# Patient Record
Sex: FEMALE | Race: BLACK OR AFRICAN AMERICAN | NOT HISPANIC OR LATINO | Employment: FULL TIME | URBAN - METROPOLITAN AREA
[De-identification: names, ages, dates, MRNs, and addresses within clinical notes are randomized per-mention and may not be internally consistent; named-entity substitution may affect disease eponyms.]

---

## 2017-07-06 ENCOUNTER — APPOINTMENT (OUTPATIENT)
Dept: RADIOLOGY | Facility: MEDICAL CENTER | Age: 22
End: 2017-07-06
Attending: EMERGENCY MEDICINE
Payer: COMMERCIAL

## 2017-07-06 ENCOUNTER — HOSPITAL ENCOUNTER (EMERGENCY)
Facility: MEDICAL CENTER | Age: 22
End: 2017-07-06
Attending: EMERGENCY MEDICINE
Payer: COMMERCIAL

## 2017-07-06 VITALS
HEART RATE: 74 BPM | RESPIRATION RATE: 16 BRPM | SYSTOLIC BLOOD PRESSURE: 112 MMHG | DIASTOLIC BLOOD PRESSURE: 64 MMHG | HEIGHT: 63 IN | TEMPERATURE: 97.8 F | BODY MASS INDEX: 29.8 KG/M2 | WEIGHT: 168.21 LBS | OXYGEN SATURATION: 99 %

## 2017-07-06 DIAGNOSIS — R10.9 ABDOMINAL PAIN IN PREGNANCY, FIRST TRIMESTER: ICD-10-CM

## 2017-07-06 DIAGNOSIS — O26.891 ABDOMINAL PAIN IN PREGNANCY, FIRST TRIMESTER: ICD-10-CM

## 2017-07-06 LAB
ANION GAP SERPL CALC-SCNC: 6 MMOL/L (ref 0–11.9)
APPEARANCE UR: CLEAR
APPEARANCE UR: CLEAR
B-HCG SERPL-ACNC: 2295.2 MIU/ML (ref 0–5)
BASOPHILS # BLD AUTO: 0.3 % (ref 0–1.8)
BASOPHILS # BLD: 0.03 K/UL (ref 0–0.12)
BILIRUB UR QL STRIP.AUTO: NEGATIVE
BUN SERPL-MCNC: 6 MG/DL (ref 8–22)
CALCIUM SERPL-MCNC: 9.3 MG/DL (ref 8.5–10.5)
CHLORIDE SERPL-SCNC: 107 MMOL/L (ref 96–112)
CO2 SERPL-SCNC: 23 MMOL/L (ref 20–33)
COLOR UR AUTO: YELLOW
COLOR UR: YELLOW
CREAT SERPL-MCNC: 0.67 MG/DL (ref 0.5–1.4)
CULTURE IF INDICATED INDCX: NO UA CULTURE
EOSINOPHIL # BLD AUTO: 0.17 K/UL (ref 0–0.51)
EOSINOPHIL NFR BLD: 1.8 % (ref 0–6.9)
ERYTHROCYTE [DISTWIDTH] IN BLOOD BY AUTOMATED COUNT: 42.1 FL (ref 35.9–50)
GFR SERPL CREATININE-BSD FRML MDRD: >60 ML/MIN/1.73 M 2
GLUCOSE SERPL-MCNC: 84 MG/DL (ref 65–99)
GLUCOSE UR QL STRIP.AUTO: NEGATIVE MG/DL
GLUCOSE UR STRIP.AUTO-MCNC: NEGATIVE MG/DL
HCG UR QL: POSITIVE
HCT VFR BLD AUTO: 35.1 % (ref 37–47)
HGB BLD-MCNC: 11.3 G/DL (ref 12–16)
IMM GRANULOCYTES # BLD AUTO: 0.02 K/UL (ref 0–0.11)
IMM GRANULOCYTES NFR BLD AUTO: 0.2 % (ref 0–0.9)
KETONES UR QL STRIP.AUTO: ABNORMAL MG/DL
KETONES UR STRIP.AUTO-MCNC: ABNORMAL MG/DL
LEUKOCYTE ESTERASE UR QL STRIP.AUTO: NEGATIVE
LEUKOCYTE ESTERASE UR QL STRIP.AUTO: NEGATIVE
LYMPHOCYTES # BLD AUTO: 2.91 K/UL (ref 1–4.8)
LYMPHOCYTES NFR BLD: 31 % (ref 22–41)
MCH RBC QN AUTO: 28.4 PG (ref 27–33)
MCHC RBC AUTO-ENTMCNC: 32.2 G/DL (ref 33.6–35)
MCV RBC AUTO: 88.2 FL (ref 81.4–97.8)
MICRO URNS: ABNORMAL
MONOCYTES # BLD AUTO: 0.57 K/UL (ref 0–0.85)
MONOCYTES NFR BLD AUTO: 6.1 % (ref 0–13.4)
NEUTROPHILS # BLD AUTO: 5.68 K/UL (ref 2–7.15)
NEUTROPHILS NFR BLD: 60.6 % (ref 44–72)
NITRITE UR QL STRIP.AUTO: NEGATIVE
NITRITE UR QL STRIP.AUTO: NEGATIVE
NRBC # BLD AUTO: 0 K/UL
NRBC BLD AUTO-RTO: 0 /100 WBC
NUMBER OF RH DOSES IND 8505RD: NORMAL
PH UR STRIP.AUTO: 5.5 [PH]
PH UR STRIP.AUTO: 6 [PH]
PLATELET # BLD AUTO: 414 K/UL (ref 164–446)
PMV BLD AUTO: 9.6 FL (ref 9–12.9)
POTASSIUM SERPL-SCNC: 3 MMOL/L (ref 3.6–5.5)
PROT UR QL STRIP: NEGATIVE MG/DL
PROT UR QL STRIP: NEGATIVE MG/DL
RBC # BLD AUTO: 3.98 M/UL (ref 4.2–5.4)
RBC UR QL AUTO: NEGATIVE
RBC UR QL AUTO: NEGATIVE
RH BLD: NORMAL
SODIUM SERPL-SCNC: 136 MMOL/L (ref 135–145)
SP GR UR STRIP.AUTO: 1.03
SP GR UR: 1.02
WBC # BLD AUTO: 9.4 K/UL (ref 4.8–10.8)

## 2017-07-06 PROCEDURE — 99284 EMERGENCY DEPT VISIT MOD MDM: CPT

## 2017-07-06 PROCEDURE — 81025 URINE PREGNANCY TEST: CPT

## 2017-07-06 PROCEDURE — 85025 COMPLETE CBC W/AUTO DIFF WBC: CPT

## 2017-07-06 PROCEDURE — 81002 URINALYSIS NONAUTO W/O SCOPE: CPT

## 2017-07-06 PROCEDURE — 700111 HCHG RX REV CODE 636 W/ 250 OVERRIDE (IP): Performed by: EMERGENCY MEDICINE

## 2017-07-06 PROCEDURE — 84702 CHORIONIC GONADOTROPIN TEST: CPT

## 2017-07-06 PROCEDURE — 86901 BLOOD TYPING SEROLOGIC RH(D): CPT

## 2017-07-06 PROCEDURE — 36415 COLL VENOUS BLD VENIPUNCTURE: CPT

## 2017-07-06 PROCEDURE — 76817 TRANSVAGINAL US OBSTETRIC: CPT

## 2017-07-06 PROCEDURE — 80048 BASIC METABOLIC PNL TOTAL CA: CPT

## 2017-07-06 PROCEDURE — 81003 URINALYSIS AUTO W/O SCOPE: CPT

## 2017-07-06 RX ORDER — ONDANSETRON 4 MG/1
4 TABLET, ORALLY DISINTEGRATING ORAL EVERY 8 HOURS PRN
Qty: 10 TAB | Refills: 1 | Status: SHIPPED | OUTPATIENT
Start: 2017-07-06 | End: 2017-07-18

## 2017-07-06 RX ORDER — ONDANSETRON 4 MG/1
4 TABLET, ORALLY DISINTEGRATING ORAL ONCE
Status: COMPLETED | OUTPATIENT
Start: 2017-07-06 | End: 2017-07-06

## 2017-07-06 RX ADMIN — ONDANSETRON 4 MG: 4 TABLET, ORALLY DISINTEGRATING ORAL at 16:34

## 2017-07-06 ASSESSMENT — PAIN SCALES - GENERAL: PAINLEVEL_OUTOF10: 6

## 2017-07-06 NOTE — ED NOTES
Pt to triage c/o abd cramping and nausea x 2 weeks. Pt pregnant, unsure how far she is. Denies vaginal bleeding. Pt advised to return to triage nurse for any changes or concerns.

## 2017-07-06 NOTE — ED AVS SNAPSHOT
7/6/2017    Caroline Jorgensen  [address] [place] [address]    Dear Caroline:    [facility] wants to ensure your discharge home is safe and you or your loved ones have had all of your questions answered regarding your care after you leave the hospital.    Below is a list of resources and contact information should you have any questions regarding your hospital stay, follow-up instructions, or active medical symptoms.    Questions or Concerns Regarding… Contact   Medical Questions Related to Your Discharge  (7 days a week, 8am-5pm) Contact a Nurse Care Coordinator   [phone]   Medical Questions Not Related to Your Discharge  (24 hours a day / 7 days a week)  Contact the Nurse Health Line   [phone]    Medications or Discharge Instructions Refer to your discharge packet   or contact your [facility] Primary Care Provider   [phone]   Follow-up Appointment(s) Schedule your appointment via [employer]   or contact Scheduling [phone]   Billing Review your statement via [employer]  or contact Billing [phone]   Medical Records Review your records via [employer]   or contact Medical Records [phone]     You may receive a telephone call within two days of discharge. This call is to make certain you understand your discharge instructions and have the opportunity to have any questions answered. You can also easily access your medical information, test results and upcoming appointments via the [employer] First free online health management tool. You can learn more and sign up at [employer] First. For assistance setting up your [employer] First account, please call [phone].    Once again, we want to ensure your discharge home is safe and that you have a clear understanding of any next steps in your care. If you have any questions or concerns, please do not hesitate to contact us, we are here for you. Thank you for choosing [facility] for your healthcare needs.    Sincerely,    Your [facility] Healthcare Team

## 2017-07-06 NOTE — ED AVS SNAPSHOT
Home Care Instructions                                                                                                                Caroline Jorgensen   MRN: [id number]    Department:  [facility], Emergency Dept   Date of Visit:  7/6/2017            [facility], Emergency Dept    [address] [place] [id number]    Phone:  [phone]      You were seen by     Eddie Paredes D.O.      Your Diagnosis Was     Abdominal pain in pregnancy, first trimester     O26.891, R10.9       These are the medications you received during your hospitalization from 07/06/2017 1204 to 07/06/2017 1834     Date/Time Order Dose Route Action    07/06/2017 1634 ondansetron (ZOFRAN ODT) dispertab 4 mg 4 mg Oral Given      Follow-up Information     1. Follow up with Sherri Cortés M.D..    Specialty:  OB/Gyn    Contact information    [address]  [facility] [address] [phone]          2. Follow up with [facility], Emergency Dept In 2 days.    Specialty:  Emergency Medicine    Why:  For repeat blood test    Contact information    [address] [place] [id number] [phone]        3. Schedule an appointment as soon as possible for a visit with Sherri Cortés M.D..    Specialty:  OB/Gyn    Contact information    [address]  [facility] [address] [phone]        Medication Information     Review all of your home medications and newly ordered medications with your primary doctor and/or pharmacist as soon as possible. Follow medication instructions as directed by your doctor and/or pharmacist.     Please keep your complete medication list with you and share with your physician. Update the information when medications are discontinued, doses are changed, or new medications (including over-the-counter products) are added; and carry medication information at all times in the event of emergency situations.               Medication List      START  taking these medications        Instructions    Morning Afternoon Evening Bedtime    ondansetron 4 MG Tbdp   Last time this was given:  4 mg on 7/6/2017  4:34 PM   Commonly known as:  ZOFRAN ODT        Take 1 Tab by mouth every 8 hours as needed for Nausea/Vomiting.   Dose:  4 mg                             Where to Get Your Medications      You can get these medications from any pharmacy     Bring a paper prescription for each of these medications    - ondansetron 4 MG Tbdp            Procedures and tests performed during your visit     BASIC METABOLIC PANEL    CBC WITH DIFFERENTIAL    ESTIMATED GFR    HCG QUANTITATIVE    POC UA    POC URINE PREGNANCY    RH TYPE FOR RHOGAM FROM E.D.    URINALYSIS,CULTURE IF INDICATED    US-OB PELVIS TRANSVAGINAL        Discharge Instructions       Abdominal Pain During Pregnancy  Belly (abdominal) pain is common during pregnancy. Most of the time, it is not a serious problem. Other times, it can be a sign that something is wrong with the pregnancy. Always tell your doctor if you have belly pain.  HOME CARE  Monitor your belly pain for any changes. The following actions may help you feel better:  · Do not have sex (intercourse) or put anything in your vagina until you feel better.  · Rest until your pain stops.  · Drink clear fluids if you feel sick to your stomach (nauseous). Do not eat solid food until you feel better.  · Only take medicine as told by your doctor.  · Keep all doctor visits as told.  GET HELP RIGHT AWAY IF:   · You are bleeding, leaking fluid, or pieces of tissue come out of your vagina.  · You have more pain or cramping.  · You keep throwing up (vomiting).  · You have pain when you pee (urinate) or have blood in your pee.  · You have a fever.  · You do not feel your baby moving as much.  · You feel very weak or feel like passing out.  · You have trouble breathing, with or without belly pain.  · You have a very bad headache and belly pain.  · You have fluid leaking  from your vagina and belly pain.  · You keep having watery poop (diarrhea).  · Your belly pain does not go away after resting, or the pain gets worse.  MAKE SURE YOU:   · Understand these instructions.  · Will watch your condition.  · Will get help right away if you are not doing well or get worse.     This information is not intended to replace advice given to you by your health care provider. Make sure you discuss any questions you have with your health care provider.     Document Released: 12/06/2010 Document Revised: 08/20/2014 Document Reviewed: 07/17/2014  [employer] Interactive Patient Education ©2016 [employer]            Patient Information     Patient Information    Following emergency treatment: all patient requiring follow-up care must return either to a private physician or a clinic if your condition worsens before you are able to obtain further medical attention, please return to the emergency room.     Billing Information    At [facility], we work to make the billing process streamlined for our patients.  Our Representatives are here to answer any questions you may have regarding your hospital bill.  If you have insurance coverage and have supplied your insurance information to us, we will submit a claim to your insurer on your behalf.  Should you have any questions regarding your bill, we can be reached online or by phone as follows:  Online: You are able pay your bills online or live chat with our representatives about any billing questions you may have. We are here to help Monday - Friday from 8:00am to 7:30pm and 9:00am - 12:00pm on Saturdays.  Please visit [email or web address]  for more information.   Phone:  [phone] or [phone]    Please note that your emergency physician, surgeon, pathologist, radiologist, anesthesiologist, and other specialists are not employed by [facility] and will therefore bill separately for their services.  Please contact them  directly for any questions concerning their bills at the numbers below:     Emergency Physician Services:  [phone]  [facility]:  [phone]  [facility]:  [phone]  [facility]:  [phone]    1. Your final bill may vary from the amount quoted upon discharge if all procedures are not complete at that time, or if your doctor has additional procedures of which we are not aware. You will receive an additional bill if you return to the Emergency Department at [facility] for suture removal regardless of the facility of which the sutures were placed.     2. Please arrange for settlement of this account at the emergency registration.    3. All self-pay accounts are due in full at the time of treatment.  If you are unable to meet this obligation then payment is expected within 4-5 days.     4. If you have had radiology studies (CT, X-ray, Ultrasound, MRI), you have received a preliminary result during your emergency department visit. Please contact the radiology department [phone] to receive a copy of your final result. Please discuss the Final result with your primary physician or with the follow up physician provided.     Crisis Hotline:  [place] Crisis Hotline:  1-853-FIVNZJT or 1-628.617.1617  [place] Crisis Hotline:    1-880.391.9220 or 749-684-6148         ED Discharge Follow Up Questions    1. In order to provide you with very good care, we would like to follow up with a phone call in the next few days.  May we have your permission to contact you?     YES /  NO    2. What is the best phone number to call you? (       )_____-__________    3. What is the best time to call you?      Morning  /  Afternoon  /  Evening                   Patient Signature:  ____________________________________________________________    Date:  ____________________________________________________________

## 2017-07-06 NOTE — ED AVS SNAPSHOT
Biosyntech Access Code: [email or web address]  Expires: 8/5/2017  6:34 PM    Your email address is not on file at [facility].  Email Addresses are required for you to sign up for [employer], please contact [phone] to verify your personal information and to provide your email address prior to attempting to register for [employer]yn[employer].    Caroline Jorgensen  [address] [facility] [place] [address]    B[employer]tech  A secure, online tool to manage your health information     [employer]’s Biosyntech® is a secure, online tool that connects you to your personalized health information from the privacy of your home -- day or night - making it very easy for you to manage your healthcare. Once the activation process is completed, you can even access your medical information using the Biosyntech zackary, which is available for free in the Apple Zackary store or Google Play store.     To learn more about Biosyntech, visit [email or web address]    There are two levels of access available (as shown below):   My Chart Features  [facility] Primary Care Doctor [facility]  Specialists [facility]  Urgent  Care Non-[facility] Primary Care Doctor   Email your healthcare team securely and privately 24/7 X X X    Manage appointments: schedule your next appointment; view details of past/upcoming appointments X      Request prescription refills. X      View recent personal medical records, including lab and immunizations X X X X   View health record, including health history, allergies, medications X X X X   Read reports about your outpatient visits, procedures, consult and ER notes X X X X   See your discharge summary, which is a recap of your hospital and/or ER visit that includes your diagnosis, lab results, and care plan X X  X     How to register for [employer]:  Once your e-mail address has been verified, follow the following steps to sign up for Biosyntech.     1. Go to  [email or web address]  2. Click on the Sign Up Now box, which takes you to the New Member Sign Up page. You will need to  provide the following information:  a. Enter your Etohum Access Code exactly as it appears at the top of this page. (You will not need to use this code after you’ve completed the sign-up process. If you do not sign up before the expiration date, you must request a new code.)   b. Enter your date of birth.   c. Enter your home email address.   d. Click Submit, and follow the next screen’s instructions.  3. Create a Etohum ID. This will be your Etohum login ID and cannot be changed, so think of one that is secure and easy to remember.  4. Create a Etohum password. You can change your password at any time.  5. Enter your Password Reset Question and Answer. This can be used at a later time if you forget your password.   6. Enter your e-mail address. This allows you to receive e-mail notifications when new information is available in Etohum.  7. Click Sign Up. You can now view your health information.    For assistance activating your Etohum account, call [phone]

## 2017-07-07 NOTE — ED PROVIDER NOTES
"ED Provider Note    CHIEF COMPLAINT  Chief Complaint   Patient presents with   • Cramping       HPI  Caroline Jorgensen is a 22 y.o. female who presents to the emergency room today with complaints of abdominal pain, cramping with nausea with pregnancy. Patient states her last missed her period was the end of May/beginning of . She has a history of  2 para 0. Took a home pregnancy test which was positive. She denies any vaginal bleeding or discharge. No fever chills or changes to bowel or bladder. Pain is described cramping currently rated at 2/10, radiating to both sides of her lower abdomen. Denies chest pain no shortness of breath or other complaints at this time.    REVIEW OF SYSTEMS  See HPI for further details. All other systems are negative.      PAST MEDICAL HISTORY  No past medical history on file.    FAMILY HISTORY  No family history on file.    SOCIAL HISTORY  Social History     Social History   • Marital Status: Single     Spouse Name: N/A   • Number of Children: N/A   • Years of Education: N/A     Social History Main Topics   • Smoking status: Not on file   • Smokeless tobacco: Not on file   • Alcohol Use: Not on file   • Drug Use: Not on file   • Sexual Activity: Not on file     Other Topics Concern   • Not on file     Social History Narrative   • No narrative on file       SURGICAL HISTORY  No past surgical history on file.    CURRENT MEDICATIONS  Home Medications     **Home medications have not yet been reviewed for this encounter**          ALLERGIES  No Known Allergies    PHYSICAL EXAM  VITAL SIGNS: /64 mmHg  Pulse 74  Temp(Src) 36.6 °C (97.8 °F) (Temporal)  Resp 16  Ht 1.6 m (5' 3\")  Wt 76.3 kg (168 lb 3.4 oz)  BMI 29.80 kg/m2  SpO2 99%      Constitutional: Well developed, Well nourished, No acute distress, Non-toxic appearance.   HENT: Normocephalic, Atraumatic, Bilateral external ears normal, Oropharynx moist, No oral exudates, Nose normal.   Eyes: PERRLA, EOMI, Conjunctiva " normal, No discharge.   Neck: Normal range of motion, No tenderness, Supple, No stridor.   Lymphatic: No lymphadenopathy noted.   Cardiovascular: Normal heart rate, Normal rhythm, No murmurs, No rubs, No gallops.   Thorax & Lungs: Normal breath sounds, No respiratory distress, No wheezing, No chest tenderness.   Abdomen: Bowel sounds normal, Soft, No tenderness, No masses, No pulsatile masses. No rebound, guarding or peritoneal signs noted  Genitalia: Patient declined   Skin: Warm, Dry, No erythema, No rash.   Back: No tenderness, No CVA tenderness.     RADIOLOGY/PROCEDURES  US-OB PELVIS TRANSVAGINAL   Final Result      1.  Tiny fluid collection within the endometrial canal; early pregnancy versus pseudosac from ectopic pregnancy. Recommend followup ultrasound and serial beta-hCG levels.   2.  Probable LEFT ovary corpus luteum.               COURSE & MEDICAL DECISION MAKING  Pertinent Labs & Imaging studies reviewed. (See chart for details)  Patient's quantitative hCG is 2295, Rh is positive. Chest sounds shows early pregnancy versus possible ectopic. Discussed case with Dr. mccall who is on-call for GYN. She advises follow-up on 6/8 for repeat quantitative hCG. Cannot fully rule out ectopic present this time patient vital signs are stable she appears in no distress on exam. She was told the importance of follow-up for repeat testing and the above findings cannot rule out ectopic pregnancy versus early pregnancy. She is placed in Zofran for nausea in pregnancy. The patient and her significant other will return as described above discharged in stable condition.      FINAL IMPRESSION  1. Acute abdominal pain in pregnancy  2.   3.      Electronically signed by: Eddie Paredes, 7/6/2017 9:55 PM

## 2017-07-07 NOTE — ED NOTES
Pt given discharge instructions and prescription. Pt verbalized understanding. RN to answer any questions pt and family had. Pt instructed to return on Saturday to repeat her blood work. VSS. Pt ambulated out to front [facility].

## 2017-07-07 NOTE — DISCHARGE INSTRUCTIONS
Abdominal Pain During Pregnancy  Belly (abdominal) pain is common during pregnancy. Most of the time, it is not a serious problem. Other times, it can be a sign that something is wrong with the pregnancy. Always tell your doctor if you have belly pain.  HOME CARE  Monitor your belly pain for any changes. The following actions may help you feel better:  · Do not have sex (intercourse) or put anything in your vagina until you feel better.  · Rest until your pain stops.  · Drink clear fluids if you feel sick to your stomach (nauseous). Do not eat solid food until you feel better.  · Only take medicine as told by your doctor.  · Keep all doctor visits as told.  GET HELP RIGHT AWAY IF:   · You are bleeding, leaking fluid, or pieces of tissue come out of your vagina.  · You have more pain or cramping.  · You keep throwing up (vomiting).  · You have pain when you pee (urinate) or have blood in your pee.  · You have a fever.  · You do not feel your baby moving as much.  · You feel very weak or feel like passing out.  · You have trouble breathing, with or without belly pain.  · You have a very bad headache and belly pain.  · You have fluid leaking from your vagina and belly pain.  · You keep having watery poop (diarrhea).  · Your belly pain does not go away after resting, or the pain gets worse.  MAKE SURE YOU:   · Understand these instructions.  · Will watch your condition.  · Will get help right away if you are not doing well or get worse.     This information is not intended to replace advice given to you by your health care provider. Make sure you discuss any questions you have with your health care provider.     Document Released: 12/06/2010 Document Revised: 08/20/2014 Document Reviewed: 07/17/2014  "[employer] Interactive Patient Education ©2016 "[employer] Inc.

## 2017-07-08 ENCOUNTER — APPOINTMENT (OUTPATIENT)
Dept: RADIOLOGY | Facility: MEDICAL CENTER | Age: 22
End: 2017-07-08
Attending: EMERGENCY MEDICINE
Payer: COMMERCIAL

## 2017-07-08 ENCOUNTER — HOSPITAL ENCOUNTER (EMERGENCY)
Facility: MEDICAL CENTER | Age: 22
End: 2017-07-08
Attending: EMERGENCY MEDICINE
Payer: COMMERCIAL

## 2017-07-08 VITALS
TEMPERATURE: 96.6 F | HEART RATE: 72 BPM | SYSTOLIC BLOOD PRESSURE: 98 MMHG | BODY MASS INDEX: 29.61 KG/M2 | HEIGHT: 63 IN | DIASTOLIC BLOOD PRESSURE: 58 MMHG | OXYGEN SATURATION: 100 % | WEIGHT: 167.11 LBS | RESPIRATION RATE: 20 BRPM

## 2017-07-08 DIAGNOSIS — R10.9 ABDOMINAL PAIN IN PREGNANCY, FIRST TRIMESTER: ICD-10-CM

## 2017-07-08 DIAGNOSIS — O26.891 ABDOMINAL PAIN IN PREGNANCY, FIRST TRIMESTER: ICD-10-CM

## 2017-07-08 LAB
B-HCG SERPL-ACNC: 4082 MIU/ML (ref 0–5)
HCG SERPL QL: POSITIVE

## 2017-07-08 PROCEDURE — 84702 CHORIONIC GONADOTROPIN TEST: CPT

## 2017-07-08 PROCEDURE — 36415 COLL VENOUS BLD VENIPUNCTURE: CPT

## 2017-07-08 PROCEDURE — 99284 EMERGENCY DEPT VISIT MOD MDM: CPT

## 2017-07-08 PROCEDURE — 84703 CHORIONIC GONADOTROPIN ASSAY: CPT

## 2017-07-08 PROCEDURE — 76817 TRANSVAGINAL US OBSTETRIC: CPT

## 2017-07-08 ASSESSMENT — PAIN SCALES - GENERAL
PAINLEVEL_OUTOF10: 0

## 2017-07-08 ASSESSMENT — LIFESTYLE VARIABLES: DO YOU DRINK ALCOHOL: NO

## 2017-07-08 NOTE — ED NOTES
Pt ambulatory to room 11. Pt reports she was seen here on 7-6-17, instructed to return for repeat HCG Quant. Pt reports she is 4- 5 weeks pregnant. Denies abd pain, cramping, vaginal bleeding or vomiting.

## 2017-07-08 NOTE — ED AVS SNAPSHOT
[employer] Access Code: [email or web address]  Expires: 8/5/2017  6:34 PM    Your email address is not on file at [facility].  Email Addresses are required for you to sign up for [employer], please contact [phone] to verify your personal information and to provide your email address prior to attempting to register for [employer].    Caroline Jorgensen  [address] [place] [address]    [employer]  A secure, online tool to manage your health information     [employer]’s [employer]® is a secure, online tool that connects you to your personalized health information from the privacy of your home -- day or night - making it very easy for you to manage your healthcare. Once the activation process is completed, you can even access your medical information using the [employer] zackary, which is available for free in the Apple Zackary store or Google Play store.     To learn more about [employer], visit [email or web address]    There are two levels of access available (as shown below):   My Chart Features  [facility] Primary Care Doctor [facility]  Specialists [facility]  Urgent  Care Non-[facility] Primary Care Doctor   Email your healthcare team securely and privately 24/7 X X X    Manage appointments: schedule your next appointment; view details of past/upcoming appointments X      Request prescription refills. X      View recent personal medical records, including lab and immunizations X X X X   View health record, including health history, allergies, medications X X X X   Read reports about your outpatient visits, procedures, consult and ER notes X X X X   See your discharge summary, which is a recap of your hospital and/or ER visit that includes your diagnosis, lab results, and care plan X X  X     How to register for [employer]:  Once your e-mail address has been verified, follow the following steps to sign up for [employer].     1. Go to  [email or web address]  2. Click on the Sign Up Now box, which takes you to the New Member Sign Up page. You will need to  provide the following information:  a. Enter your [employer] Access Code exactly as it appears at the top of this page. (You will not need to use this code after you’ve completed the sign-up process. If you do not sign up before the expiration date, you must request a new code.)   b. Enter your date of birth.   c. Enter your home email address.   d. Click Submit, and follow the next screen’s instructions.  3. Create a [employer] ID. This will be your [employer] login ID and cannot be changed, so think of one that is secure and easy to remember.  4. Create a [employer] password. You can change your password at any time.  5. Enter your Password Reset Question and Answer. This can be used at a later time if you forget your password.   6. Enter your e-mail address. This allows you to receive e-mail notifications when new information is available in WeGreek.  7. Click Sign Up. You can now view your health information.    For assistance activating your WeGreek account, call [phone]

## 2017-07-08 NOTE — ED NOTES
Patient ambulatory to triage:  Chief Complaint   Patient presents with   • Other     seen recently in ED, told to return in 2 days for recheck of ACG     Explained wait time and triage process to pt. Pt placed back out in lobby, told to notify ED tech or triage RN of any changes, verbalized understanding.

## 2017-07-08 NOTE — ED PROVIDER NOTES
"ED Provider Note    Scribed for Rufus Prieto D.O. by Helena Yepez. 2017  6:47 AM    Primary care provider: None  Means of arrival: Walk in  History obtained from: Patient  History limited by: None    CHIEF COMPLAINT  Chief Complaint   Patient presents with   • Follow Up for Laboratory Testing       HPI  Caroline Jorgensen is a 22 y.o. female who presents to the Emergency Department for a follow up for laboratory testing.  Patient was evaluated in the ED two days ago for intermittent abdominal cramping.  Patient is four weeks pregnant with a history of .  Positive home pregnancy test.  Last menstrual cycle was in 2017.  HCG was 2295 and the patient was asked to have a repeat HCG completed in two days.  Patient denies any abdominal pain, vaginal bleeding, vaginal discharge or dysuria.  She has yet to follow up with Gynecology/Obstetrics.      REVIEW OF SYSTEMS  Pertinent positives include follow up for laboratory testing. Pertinent negatives include no abdominal pain, vaginal bleeding, vaginal discharge or dysuria.  All other systems reviewed and negative. C.      PAST MEDICAL HISTORY  Patient is four weeks pregnant with a history of .  Last menstrual cycle was in 2017.      SURGICAL HISTORY  History reviewed. No pertinent past surgical history.       SOCIAL HISTORY  Social History   Substance Use Topics   • Smoking status: Never Smoker    • Smokeless tobacco: None   • Alcohol Use: No      History   Drug Use No       FAMILY HISTORY  History reviewed. No pertinent family history.      CURRENT MEDICATIONS  Home Medications     Reviewed by Nickie Ospina R.N. (Registered Nurse) on 17 at 0644  Med List Status: Complete    Medication Last Dose Status    ondansetron (ZOFRAN ODT) 4 MG TABLET DISPERSIBLE not taking Active                ALLERGIES  None      PHYSICAL EXAM  VITAL SIGNS: /63 mmHg  Pulse 94  Temp(Src) 35.9 °C (96.6 °F)  Resp 16  Ht 1.6 m (5' 3\")  Wt 75.8 kg (167 lb 1.7 oz)  " BMI 29.61 kg/m2  SpO2 100%  LMP 05/27/2017    Nursing notes and vitals reviewed.    Constitutional: Well developed, Well nourished, No acute distress, Non-toxic appearance.   Eyes: PERRLA, EOMI, Conjunctiva normal, No discharge.   Cardiovascular: Normal heart rate, Normal rhythm, No murmurs, No rubs, No gallops.   Thorax & Lungs: No respiratory distress, No rales, No rhonchi, No wheezing, No chest tenderness.   Abdomen: Bowel sounds normal, Soft, No tenderness, No guarding, No rebound, No masses, No pulsatile masses.   Skin: Warm, Dry, No erythema, No rash.   Musculoskeletal: Intact distal pulses, No edema, No cyanosis, No clubbing. Good range of motion in all major joints. No tenderness to palpation or major deformities noted, no CVA tenderness, no midline back tenderness.   Neurologic: Alert & oriented x 3, Normal motor function, Normal sensory function, No focal deficits noted.  Psychiatric: Affect normal for clinical presentation.      DIAGNOSTIC STUDIES/PROCEDURES    LABS  Results for orders placed or performed during the hospital encounter of 07/08/17   HCG Qual Serum   Result Value Ref Range    Beta-Hcg Qualitative Serum Positive (A) Negative   HCG QUANTITATIVE   Result Value Ref Range    Bhcg 4082.0 (H) 0.0 - 5.0 mIU/mL      All labs reviewed by me.        RADIOLOGY  US-OB PELVIS TRANSVAGINAL   Final Result      1.  Cystic structure again identified in the endometrial cavity which appears larger than on prior examination. This could represent an early IUP with lack of visualization of the yolk sac and fetal pole, a blighted ovum or perhaps pseudocyst sac of    ectopic.      2.  Trace pelvic free fluid.      3.  Left ovarian probable corpus luteum cyst again identified.        The radiologist's interpretation of all radiological studies have been reviewed by me.      COURSE & MEDICAL DECISION MAKING  Pertinent Labs & Imaging studies reviewed. (See chart for details)    Differential Diagnosis include but are  "not limited to: ectopic pregnancy, miscarriage or IUP.    6:47 AM Reviewed patient's electronic medical record which indicates an ED visit two days ago for abdominal cramping.  HCG was 2295; Rh positive.  US indicated a tiny fluid collection within the endometrial canal; early pregnancy versus pseudosac from ectopic pregnancy.    6:50 AM Patient seen and examined at bedside. Patient presents for a follow up for laboratory testing.  Exam is normal with no concern for an acute abdomen.      Initial orders in the Emergency Department included US OB pelvis transvaginal and laboratory testing: HCG quantitative and HCG qualitative. Patient verbalized their understanding and agreement to this plan.    9:10 AM Paged Dr. Cortés, OB.    9:18 AM On repeat evaluation, patient is doing well. ED testing reveals a HCG count of 4082.    9:41 AM Spoke with Dr. Steinberg, regarding the patient's ED visit from two days ago.  HCG counts were relayed as well as ultrasound results.    Discharge plan was discussed with the patient and includes following up with OB in two days. Patient was given an outpatient lab order for a repeat HCG in two days.      The patient will return for new or persisting symptoms including abdominal pain, vaginal bleeding or vaginal discharge.  The patient verbalizes understanding and will comply.  Patient is stable at the time of discharge.  Vital signs were reviewed: /63 mmHg  Pulse 94  Temp(Src) 35.9 °C (96.6 °F)  Resp 16  Ht 1.6 m (5' 3\")  Wt 75.8 kg (167 lb 1.7 oz)  BMI 29.61 kg/m2  SpO2 100%  LMP 05/27/2017       Decision Making:  This is a charming 22 y.o. female that presents with follow-up for previous visit. Appears that she has cystic structure within her uterus but no evidence of yolk sac or fetal pole. Here she has a beta Quant of 4082 which is increased from 2202 days prior. Repeat ultrasound reveals evidence of a cystic structure identified in a [place] but yet no gestational sac, fetal " pole yolk sac. Ectopic cannot be excluded. The patient has no evidence of neurovascular compromise currently and hasn't very benign abdominal examination. I discussed the patient with Dr. Steinberg and she states it is fine for the patient to be discharged and follow up in 2 days with a repeat beta hCG. The patient will be following up with Dr. working on Monday, 10 July with a repeat beta Quant completed. Strict return precautions have been given and pelvic rest has been recommended.      DISPOSITION  Patient will be discharged home in stable condition.      FOLLOW UP  [facility], Emergency Dept  [address] [address] [phone]    If symptoms worsen    Sherri Cortés M.D.  [address]  [address]  [place] [address]  [phone]      The patient is referred to a primary physician for blood pressure management, diabetic screening, and for all other preventative health concerns.      DIAGNOSIS  1. Abdominal pain in pregnancy, first trimester           The note accurately reflects work and decisions made by me.  Rufus Prieto  7/8/2017  10:01 AM     IHelena (Bismarkibe), am scribing for, and in the presence of, Rufus Prieto D.O.    Electronically signed by: Helena Yepez (Kamilah), 7/8/2017    Rufus PINEDA D.O. personally performed the services described in this documentation, as scribed by Helena Yepez in my presence, and it is both accurate and complete.

## 2017-07-08 NOTE — ED AVS SNAPSHOT
Home Care Instructions                                                                                                                Caroline Jorgensen   MRN: [id number]    Department:  [facility], Emergency Dept   Date of Visit:  7/8/2017            [facility], Emergency Dept    [address] [address]    Phone:  [phone]      You were seen by     Rufus Prieto D.O.      Your Diagnosis Was     Abdominal pain in pregnancy, first trimester     O26.891, R10.9       Follow-up Information     1. Follow up with [facility], Emergency Dept.    Specialty:  Emergency Medicine    Why:  If symptoms worsen    Contact information    [address] [address] [phone]        2. Follow up with Sherri Cortés M.D..    Specialty:  OB/Gyn    Contact information    [address]  [address]  [facility] [address] [phone]        Medication Information     Review all of your home medications and newly ordered medications with your primary doctor and/or pharmacist as soon as possible. Follow medication instructions as directed by your doctor and/or pharmacist.     Please keep your complete medication list with you and share with your physician. Update the information when medications are discontinued, doses are changed, or new medications (including over-the-counter products) are added; and carry medication information at all times in the event of emergency situations.               Medication List      ASK your doctor about these medications        Instructions    Morning Afternoon Evening Bedtime    ondansetron 4 MG Tbdp   Commonly known as:  ZOFRAN ODT        Take 1 Tab by mouth every 8 hours as needed for Nausea/Vomiting.   Dose:  4 mg                                Procedures and tests performed during your visit     HCG QUANTITATIVE    HCG Qual Serum    US-OB PELVIS TRANSVAGINAL        Discharge Instructions       Please follow up  with a primary physician for blood pressure management, diabetic screening, and all other preventive health concerns.    Follow-up with ObGyn in 2 days for reevaluation after you have a repeat beta hCG completed. Return to the emergency department if he had increasing pain, nausea, vomiting, vaginal bleeding.    Pelvic Rest  Pelvic rest is sometimes recommended for women when:   · The placenta is partially or completely covering the opening of the cervix (placenta previa).  · There is bleeding between the uterine wall and the amniotic sac in the first trimester (subchorionic hemorrhage).  · The cervix begins to open without labor starting (incompetent cervix, cervical insufficiency).  · The labor is too early ( labor).  HOME CARE INSTRUCTIONS  · Do not have sexual intercourse, stimulation, or an orgasm.  · Do not use tampons, douche, or put anything in the vagina.  · Do not lift anything over 10 pounds (4.5 kg).  · Avoid strenuous activity or straining your pelvic muscles.  SEEK MEDICAL CARE IF:   · You have any vaginal bleeding during pregnancy. Treat this as a potential emergency.  · You have cramping pain felt low in the stomach (stronger than menstrual cramps).  · You notice vaginal discharge (watery, mucus, or bloody).  · You have a low, dull backache.  · There are regular contractions or uterine tightening.  SEEK IMMEDIATE MEDICAL CARE IF:  You have vaginal bleeding and have placenta previa.      This information is not intended to replace advice given to you by your health care provider. Make sure you discuss any questions you have with your health care provider.     Document Released: 2012 Document Revised: 2013 Document Reviewed: 2012  [employer] Interactive Patient Education ©6 [employer]      Abdominal Pain During Pregnancy  Belly (abdominal) pain is common during pregnancy. Most of the time, it is not a serious problem. Other times, it can be a sign that something is wrong  with the pregnancy. Always tell your doctor if you have belly pain.  HOME CARE  Monitor your belly pain for any changes. The following actions may help you feel better:  · Do not have sex (intercourse) or put anything in your vagina until you feel better.  · Rest until your pain stops.  · Drink clear fluids if you feel sick to your stomach (nauseous). Do not eat solid food until you feel better.  · Only take medicine as told by your doctor.  · Keep all doctor visits as told.  GET HELP RIGHT AWAY IF:   · You are bleeding, leaking fluid, or pieces of tissue come out of your vagina.  · You have more pain or cramping.  · You keep throwing up (vomiting).  · You have pain when you pee (urinate) or have blood in your pee.  · You have a fever.  · You do not feel your baby moving as much.  · You feel very weak or feel like passing out.  · You have trouble breathing, with or without belly pain.  · You have a very bad headache and belly pain.  · You have fluid leaking from your vagina and belly pain.  · You keep having watery poop (diarrhea).  · Your belly pain does not go away after resting, or the pain gets worse.  MAKE SURE YOU:   · Understand these instructions.  · Will watch your condition.  · Will get help right away if you are not doing well or get worse.     This information is not intended to replace advice given to you by your health care provider. Make sure you discuss any questions you have with your health care provider.     Document Released: 12/06/2010 Document Revised: 08/20/2014 Document Reviewed: 07/17/2014  [employer] Interactive Patient Education ©2016 [employer]            Patient Information     Patient Information    Following emergency treatment: all patient requiring follow-up care must return either to a private physician or a clinic if your condition worsens before you are able to obtain further medical attention, please return to the emergency room.     Billing Information    At [facility], we  work to make the billing process streamlined for our patients.  Our Representatives are here to answer any questions you may have regarding your hospital bill.  If you have insurance coverage and have supplied your insurance information to us, we will submit a claim to your insurer on your behalf.  Should you have any questions regarding your bill, we can be reached online or by phone as follows:  Online: You are able pay your bills online or live chat with our representatives about any billing questions you may have. We are here to help Monday - Friday from 8:00am to 7:30pm and 9:00am - 12:00pm on Saturdays.  Please visit [email or web address]  for more information.   Phone:  [phone] or [phone]    Please note that your emergency physician, surgeon, pathologist, radiologist, anesthesiologist, and other specialists are not employed by [facility] and will therefore bill separately for their services.  Please contact them directly for any questions concerning their bills at the numbers below:     Emergency Physician Services:  [phone]  [facility]:  [phone]  [facility]:  [phone]  [facility]:  [phone]    1. Your final bill may vary from the amount quoted upon discharge if all procedures are not complete at that time, or if your doctor has additional procedures of which we are not aware. You will receive an additional bill if you return to the Emergency Department at [facility] for suture removal regardless of the facility of which the sutures were placed.     2. Please arrange for settlement of this account at the emergency registration.    3. All self-pay accounts are due in full at the time of treatment.  If you are unable to meet this obligation then payment is expected within 4-5 days.     4. If you have had radiology studies (CT, X-ray, Ultrasound, MRI), you have received a preliminary result during  your emergency department visit. Please contact the radiology department [phone] to receive a copy of your final result. Please discuss the Final result with your primary physician or with the follow up physician provided.     Crisis Hotline:  [place] Crisis Hotline:  7-527-SLYLNPV or 1-650.680.3296  [place] Crisis Hotline:    1-234.481.9429 or 383-785-1950         ED Discharge Follow Up Questions    1. In order to provide you with very good care, we would like to follow up with a phone call in the next few days.  May we have your permission to contact you?     YES /  NO    2. What is the best phone number to call you? (       )_____-__________    3. What is the best time to call you?      Morning  /  Afternoon  /  Evening                   Patient Signature:  ____________________________________________________________    Date:  ____________________________________________________________

## 2017-07-08 NOTE — ED NOTES
Pt out to nurse's station asking to go home.  Pt aware ERP awaiting call back from Dr Cortés prior to discharge.  Dr Cortés re-paged by

## 2017-07-08 NOTE — ED NOTES
Pt given d/c instructions and f/u info with verbal understanding.  Pt provided with lab slip for repeat HCG and advised to have it drawn 7/10 per ERP.  VSS at discharge.  Pt ambulatory from the ED w/ steady gait accompanied by significant other.  All belongings in possession on discharge.  Pt escorted to the lobby by RN.

## 2017-07-08 NOTE — ED AVS SNAPSHOT
7/8/2017    Caroline Jorgensen  [address] [place] [address]    Dear Caroline:    [facility] wants to ensure your discharge home is safe and you or your loved ones have had all of your questions answered regarding your care after you leave the hospital.    Below is a list of resources and contact information should you have any questions regarding your hospital stay, follow-up instructions, or active medical symptoms.    Questions or Concerns Regarding… Contact   Medical Questions Related to Your Discharge  (7 days a week, 8am-5pm) Contact a Nurse Care Coordinator   [phone]   Medical Questions Not Related to Your Discharge  (24 hours a day / 7 days a week)  Contact the Nurse Health Line   [phone]    Medications or Discharge Instructions Refer to your discharge packet   or contact your [facility] Primary Care Provider   [phone]   Follow-up Appointment(s) Schedule your appointment via [employer]   or contact Scheduling [phone]   Billing Review your statement via [employer]  or contact Billing [phone]   Medical Records Review your records via [employer]   or contact Medical Records [phone]     You may receive a telephone call within two days of discharge. This call is to make certain you understand your discharge instructions and have the opportunity to have any questions answered. You can also easily access your medical information, test results and upcoming appointments via the [employer] free online health management tool. You can learn more and sign up at [email or web address]. For assistance setting up your [employer] account, please call [phone].    Once again, we want to ensure your discharge home is safe and that you have a clear understanding of any next steps in your care. If you have any questions or concerns, please do not hesitate to contact us, we are here for you. Thank you for choosing [facility] for your healthcare needs.    Sincerely,    Your [facility] Healthcare Team

## 2017-07-08 NOTE — DISCHARGE INSTRUCTIONS
Please follow up with a primary physician for blood pressure management, diabetic screening, and all other preventive health concerns.    Follow-up with ObGyn in 2 days for reevaluation after you have a repeat beta hCG completed. Return to the emergency department if he had increasing pain, nausea, vomiting, vaginal bleeding.    Pelvic Rest  Pelvic rest is sometimes recommended for women when:   · The placenta is partially or completely covering the opening of the cervix (placenta previa).  · There is bleeding between the uterine wall and the amniotic sac in the first trimester (subchorionic hemorrhage).  · The cervix begins to open without labor starting (incompetent cervix, cervical insufficiency).  · The labor is too early ( labor).  HOME CARE INSTRUCTIONS  · Do not have sexual intercourse, stimulation, or an orgasm.  · Do not use tampons, douche, or put anything in the vagina.  · Do not lift anything over 10 pounds (4.5 kg).  · Avoid strenuous activity or straining your pelvic muscles.  SEEK MEDICAL CARE IF:   · You have any vaginal bleeding during pregnancy. Treat this as a potential emergency.  · You have cramping pain felt low in the stomach (stronger than menstrual cramps).  · You notice vaginal discharge (watery, mucus, or bloody).  · You have a low, dull backache.  · There are regular contractions or uterine tightening.  SEEK IMMEDIATE MEDICAL CARE IF:  You have vaginal bleeding and have placenta previa.      This information is not intended to replace advice given to you by your health care provider. Make sure you discuss any questions you have with your health care provider.     Document Released: 2012 Document Revised: 2013 Document Reviewed: 2012  [employer] Interactive Patient Education ©2016 [employer]      Abdominal Pain During Pregnancy  Belly (abdominal) pain is common during pregnancy. Most of the time, it is not a serious problem. Other times, it can be a sign that  something is wrong with the pregnancy. Always tell your doctor if you have belly pain.  HOME CARE  Monitor your belly pain for any changes. The following actions may help you feel better:  · Do not have sex (intercourse) or put anything in your vagina until you feel better.  · Rest until your pain stops.  · Drink clear fluids if you feel sick to your stomach (nauseous). Do not eat solid food until you feel better.  · Only take medicine as told by your doctor.  · Keep all doctor visits as told.  GET HELP RIGHT AWAY IF:   · You are bleeding, leaking fluid, or pieces of tissue come out of your vagina.  · You have more pain or cramping.  · You keep throwing up (vomiting).  · You have pain when you pee (urinate) or have blood in your pee.  · You have a fever.  · You do not feel your baby moving as much.  · You feel very weak or feel like passing out.  · You have trouble breathing, with or without belly pain.  · You have a very bad headache and belly pain.  · You have fluid leaking from your vagina and belly pain.  · You keep having watery poop (diarrhea).  · Your belly pain does not go away after resting, or the pain gets worse.  MAKE SURE YOU:   · Understand these instructions.  · Will watch your condition.  · Will get help right away if you are not doing well or get worse.     This information is not intended to replace advice given to you by your health care provider. Make sure you discuss any questions you have with your health care provider.     Document Released: 12/06/2010 Document Revised: 08/20/2014 Document Reviewed: 07/17/2014  [employer] Interactive Patient Education ©2016 [employer]

## 2017-07-10 ENCOUNTER — HOSPITAL ENCOUNTER (OUTPATIENT)
Dept: LAB | Facility: MEDICAL CENTER | Age: 22
End: 2017-07-10
Attending: EMERGENCY MEDICINE
Payer: COMMERCIAL

## 2017-07-10 LAB — B-HCG SERPL-ACNC: ABNORMAL MIU/ML (ref 0–5)

## 2017-07-10 PROCEDURE — 36415 COLL VENOUS BLD VENIPUNCTURE: CPT

## 2017-07-10 PROCEDURE — 84702 CHORIONIC GONADOTROPIN TEST: CPT

## 2017-07-18 ENCOUNTER — HOSPITAL ENCOUNTER (EMERGENCY)
Facility: MEDICAL CENTER | Age: 22
End: 2017-07-18
Attending: EMERGENCY MEDICINE
Payer: COMMERCIAL

## 2017-07-18 VITALS
RESPIRATION RATE: 16 BRPM | DIASTOLIC BLOOD PRESSURE: 72 MMHG | WEIGHT: 162.7 LBS | BODY MASS INDEX: 28.83 KG/M2 | HEIGHT: 63 IN | TEMPERATURE: 99.9 F | SYSTOLIC BLOOD PRESSURE: 112 MMHG | OXYGEN SATURATION: 98 % | HEART RATE: 84 BPM

## 2017-07-18 DIAGNOSIS — R11.2 NAUSEA AND VOMITING, INTRACTABILITY OF VOMITING NOT SPECIFIED, UNSPECIFIED VOMITING TYPE: ICD-10-CM

## 2017-07-18 DIAGNOSIS — Z34.90 PREGNANCY, UNSPECIFIED GESTATIONAL AGE: ICD-10-CM

## 2017-07-18 LAB
ALBUMIN SERPL BCP-MCNC: 4.6 G/DL (ref 3.2–4.9)
ALBUMIN/GLOB SERPL: 1.3 G/DL
ALP SERPL-CCNC: 56 U/L (ref 30–99)
ALT SERPL-CCNC: 9 U/L (ref 2–50)
ANION GAP SERPL CALC-SCNC: 11 MMOL/L (ref 0–11.9)
AST SERPL-CCNC: 14 U/L (ref 12–45)
BILIRUB SERPL-MCNC: 0.5 MG/DL (ref 0.1–1.5)
BUN SERPL-MCNC: 8 MG/DL (ref 8–22)
CALCIUM SERPL-MCNC: 10.1 MG/DL (ref 8.5–10.5)
CHLORIDE SERPL-SCNC: 103 MMOL/L (ref 96–112)
CO2 SERPL-SCNC: 20 MMOL/L (ref 20–33)
CREAT SERPL-MCNC: 0.63 MG/DL (ref 0.5–1.4)
GFR SERPL CREATININE-BSD FRML MDRD: >60 ML/MIN/1.73 M 2
GLOBULIN SER CALC-MCNC: 3.6 G/DL (ref 1.9–3.5)
GLUCOSE SERPL-MCNC: 78 MG/DL (ref 65–99)
POTASSIUM SERPL-SCNC: 3.4 MMOL/L (ref 3.6–5.5)
PROT SERPL-MCNC: 8.2 G/DL (ref 6–8.2)
SODIUM SERPL-SCNC: 134 MMOL/L (ref 135–145)

## 2017-07-18 PROCEDURE — 80053 COMPREHEN METABOLIC PANEL: CPT

## 2017-07-18 PROCEDURE — 700111 HCHG RX REV CODE 636 W/ 250 OVERRIDE (IP): Performed by: EMERGENCY MEDICINE

## 2017-07-18 PROCEDURE — 700105 HCHG RX REV CODE 258: Performed by: EMERGENCY MEDICINE

## 2017-07-18 PROCEDURE — 94760 N-INVAS EAR/PLS OXIMETRY 1: CPT

## 2017-07-18 PROCEDURE — 96374 THER/PROPH/DIAG INJ IV PUSH: CPT

## 2017-07-18 PROCEDURE — 99284 EMERGENCY DEPT VISIT MOD MDM: CPT

## 2017-07-18 PROCEDURE — 36415 COLL VENOUS BLD VENIPUNCTURE: CPT

## 2017-07-18 PROCEDURE — 96361 HYDRATE IV INFUSION ADD-ON: CPT

## 2017-07-18 RX ORDER — ONDANSETRON 2 MG/ML
4 INJECTION INTRAMUSCULAR; INTRAVENOUS ONCE
Status: COMPLETED | OUTPATIENT
Start: 2017-07-18 | End: 2017-07-18

## 2017-07-18 RX ORDER — SODIUM CHLORIDE 9 MG/ML
1000 INJECTION, SOLUTION INTRAVENOUS ONCE
Status: COMPLETED | OUTPATIENT
Start: 2017-07-18 | End: 2017-07-18

## 2017-07-18 RX ORDER — ONDANSETRON 4 MG/1
4 TABLET, ORALLY DISINTEGRATING ORAL EVERY 8 HOURS PRN
Qty: 20 TAB | Refills: 0 | Status: SHIPPED | OUTPATIENT
Start: 2017-07-18

## 2017-07-18 RX ORDER — DOXYLAMINE SUCCINATE AND PYRIDOXINE HYDROCHLORIDE, DELAYED RELEASE TABLETS 10 MG/10 MG 10; 10 MG/1; MG/1
2 TABLET, DELAYED RELEASE ORAL
Qty: 60 TAB | Refills: 0 | Status: SHIPPED | OUTPATIENT
Start: 2017-07-18

## 2017-07-18 RX ADMIN — SODIUM CHLORIDE 1000 ML: 9 INJECTION, SOLUTION INTRAVENOUS at 14:33

## 2017-07-18 RX ADMIN — ONDANSETRON 4 MG: 2 INJECTION INTRAMUSCULAR; INTRAVENOUS at 14:34

## 2017-07-18 ASSESSMENT — PAIN SCALES - GENERAL: PAINLEVEL_OUTOF10: 9

## 2017-07-18 NOTE — ED AVS SNAPSHOT
Home Care Instructions                                                                                                                Caroline Jorgensen   MRN: [id number]    Department:  [facility], Emergency Dept   Date of Visit:  7/18/2017            [facility], Emergency Dept    [address] [facility] [address]    Phone:  [phone]      You were seen by     Dex Taylor M.D.      Your Diagnosis Was     Nausea and vomiting, intractability of vomiting not specified, unspecified vomiting type     R11.2       These are the medications you received during your hospitalization from 07/18/2017 1233 to 07/18/2017 1603     Date/Time Order Dose Route Action    07/18/2017 1433 NS infusion 1,000 mL 1,000 mL Intravenous New Bag    07/18/2017 1434 ondansetron (ZOFRAN) syringe/vial injection 4 mg 4 mg Intravenous Given      Follow-up Information     1. Follow up with [facility], Emergency Dept.    Specialty:  Emergency Medicine    Why:  If symptoms worsen    Contact information    [address] [place] [id number] [phone]        2. Follow up with Sherri Cortés M.D..    Specialty:  OB/Gyn    Contact information    [facility] [address]  [address]  [facility] [address]  [phone]        Medication Information     Review all of your home medications and newly ordered medications with your primary doctor and/or pharmacist as soon as possible. Follow medication instructions as directed by your doctor and/or pharmacist.     Please keep your complete medication list with you and share with your physician. Update the information when medications are discontinued, doses are changed, or new medications (including over-the-counter products) are added; and carry medication information at all times in the event of emergency situations.               Medication List      START taking these medications        Instructions    Morning Afternoon Evening Bedtime    Doxylamine-Pyridoxine 10-10 MG Tbec delayed-release tablet   Commonly known as:  DICLEGIS        Take 2 Tabs by mouth every bedtime.   Dose:  2 Tab                        ondansetron 4 MG Tbdp   Commonly known as:  ZOFRAN ODT        Take 1 Tab by mouth every 8 hours as needed for Nausea/Vomiting.   Dose:  4 mg                             Where to Get Your Medications      You can get these medications from any pharmacy     Bring a paper prescription for each of these medications    - Doxylamine-Pyridoxine 10-10 MG Tbec delayed-release tablet  - ondansetron 4 MG Tbdp            Procedures and tests performed during your visit     COMP METABOLIC PANEL    ESTIMATED GFR    IV Saline Lock    Pulse Ox        Discharge Instructions       Morning Sickness  Morning sickness is when you feel sick to your stomach (nauseous) during pregnancy. This nauseous feeling may or may not come with vomiting. It often occurs in the morning but can be a problem any time of day. Morning sickness is most common during the first trimester, but it may continue throughout pregnancy. While morning sickness is unpleasant, it is usually harmless unless you develop severe and continual vomiting (hyperemesis gravidarum). This condition requires more intense treatment.   CAUSES   The cause of morning sickness is not completely known but seems to be related to normal hormonal changes that occur in pregnancy.  RISK FACTORS  You are at greater risk if you:  · Experienced nausea or vomiting before your pregnancy.  · Had morning sickness during a previous pregnancy.  · Are pregnant with more than one baby, such as twins.  TREATMENT   Do not use any medicines (prescription, over-the-counter, or herbal) for morning sickness without first talking to your health care provider. Your health care provider may prescribe or recommend:  · Vitamin B6 supplements.  · Anti-nausea medicines.  · The herbal medicine ginger.  HOME CARE INSTRUCTIONS   · Only take  over-the-counter or prescription medicines as directed by your health care provider.  · Taking multivitamins before getting pregnant can prevent or decrease the severity of morning sickness in most women.  · Eat a piece of dry toast or unsalted crackers before getting out of bed in the morning.  · Eat five or six small meals a day.  · Eat dry and bland foods (rice, baked potato). Foods high in carbohydrates are often helpful.  · Do not drink liquids with your meals. Drink liquids between meals.  · Avoid greasy, fatty, and spicy foods.  · Get someone to cook for you if the smell of any food causes nausea and vomiting.  · If you feel nauseous after taking prenatal vitamins, take the vitamins at night or with a snack.   · Snack on protein foods (nuts, yogurt, cheese) between meals if you are hungry.  · Eat unsweetened gelatins for desserts.  · Wearing an acupressure wristband (worn for sea sickness) may be helpful.  · Acupuncture may be helpful.  · Do not smoke.  · Get a humidifier to keep the air in your house free of odors.  · Get plenty of fresh air.  SEEK MEDICAL CARE IF:   · Your home remedies are not working, and you need medicine.  · You feel dizzy or lightheaded.  · You are losing weight.  SEEK IMMEDIATE MEDICAL CARE IF:   · You have persistent and uncontrolled nausea and vomiting.  · You pass out (faint).  MAKE SURE YOU:  · Understand these instructions.  · Will watch your condition.  · Will get help right away if you are not doing well or get worse.     This information is not intended to replace advice given to you by your health care provider. Make sure you discuss any questions you have with your health care provider.     Document Released: 02/08/2008 Document Revised: 12/23/2014 Document Reviewed: 06/04/2014  Elsevier Interactive Patient Education ©2016 Elsevier Inc.            Patient Information     Patient Information    Following emergency treatment: all patient requiring follow-up care must return  either to a private physician or a clinic if your condition worsens before you are able to obtain further medical attention, please return to the emergency room.     Billing Information    At [facility], we work to make the billing process streamlined for our patients.  Our Representatives are here to answer any questions you may have regarding your hospital bill.  If you have insurance coverage and have supplied your insurance information to us, we will submit a claim to your insurer on your behalf.  Should you have any questions regarding your bill, we can be reached online or by phone as follows:  Online: You are able pay your bills online or live chat with our representatives about any billing questions you may have. We are here to help Monday - Friday from 8:00am to 7:30pm and 9:00am - 12:00pm on Saturdays.  Please visit [email or web address]  for more information.   Phone:  [phone] or [phone]    Please note that your emergency physician, surgeon, pathologist, radiologist, anesthesiologist, and other specialists are not employed by [facility] and will therefore bill separately for their services.  Please contact them directly for any questions concerning their bills at the numbers below:     Emergency Physician Services:  [phone]  [facility]:  [phone]  [facility]:  [phone]  [facility]:  [phone]    1. Your final bill may vary from the amount quoted upon discharge if all procedures are not complete at that time, or if your doctor has additional procedures of which we are not aware. You will receive an additional bill if you return to the Emergency Department at [facility] for suture removal regardless of the facility of which the sutures were placed.     2. Please arrange for settlement of this account at the emergency registration.    3. All self-pay accounts are due in full at the time of  treatment.  If you are unable to meet this obligation then payment is expected within 4-5 days.     4. If you have had radiology studies (CT, X-ray, Ultrasound, MRI), you have received a preliminary result during your emergency department visit. Please contact the radiology department [phone] to receive a copy of your final result. Please discuss the Final result with your primary physician or with the follow up physician provided.     Crisis Hotline:  [place] Crisis Hotline:  5-414-HYWIIBT or 1-680.597.1017  [place] Crisis Hotline:    1-856.393.8641 or 914-600-3650         ED Discharge Follow Up Questions    1. In order to provide you with very good care, we would like to follow up with a phone call in the next few days.  May we have your permission to contact you?     YES /  NO    2. What is the best phone number to call you? (       )_____-__________    3. What is the best time to call you?      Morning  /  Afternoon  /  Evening                   Patient Signature:  ____________________________________________________________    Date:  ____________________________________________________________

## 2017-07-18 NOTE — ED NOTES
Pt reports n/v. Pt reports she is 8 weeks pregnancy. Pt reports occasional cramping. Denies vaginal bleeding. NAD. VSS

## 2017-07-18 NOTE — ED AVS SNAPSHOT
7/18/2017    Caroline Jorgensen  [address] [place] [id number]    Dear Caroline:    [facility] wants to ensure your discharge home is safe and you or your loved ones have had all of your questions answered regarding your care after you leave the hospital.    Below is a list of resources and contact information should you have any questions regarding your hospital stay, follow-up instructions, or active medical symptoms.    Questions or Concerns Regarding… Contact   Medical Questions Related to Your Discharge  (7 days a week, 8am-5pm) Contact a Nurse Care Coordinator   [phone]   Medical Questions Not Related to Your Discharge  (24 hours a day / 7 days a week)  Contact the Nurse Health Line   [phone]    Medications or Discharge Instructions Refer to your discharge packet   or contact your [facility] Primary Care Provider   [phone]   Follow-up Appointment(s) Schedule your appointment via [employer]   or contact Scheduling [phone]   Billing Review your statement via [employer]  or contact Billing [phone]   Medical Records Review your records via [employer]   or contact Medical Records [phone]     You may receive a telephone call within two days of discharge. This call is to make certain you understand your discharge instructions and have the opportunity to have any questions answered. You can also easily access your medical information, test results and upcoming appointments via the [employer] free online health management tool. You can learn more and sign up at [employer]/GAMINSIDE. For assistance setting up your GAMINSIDE account, please call [phone].    Once again, we want to ensure your discharge home is safe and that you have a clear understanding of any next steps in your care. If you have any questions or concerns, please do not hesitate to contact us, we are here for you. Thank you for choosing [facility] for your healthcare needs.    Sincerely,    Your [facility] Healthcare Team          Statement Selected

## 2017-07-18 NOTE — DISCHARGE INSTRUCTIONS
Morning Sickness  Morning sickness is when you feel sick to your stomach (nauseous) during pregnancy. This nauseous feeling may or may not come with vomiting. It often occurs in the morning but can be a problem any time of day. Morning sickness is most common during the first trimester, but it may continue throughout pregnancy. While morning sickness is unpleasant, it is usually harmless unless you develop severe and continual vomiting (hyperemesis gravidarum). This condition requires more intense treatment.   CAUSES   The cause of morning sickness is not completely known but seems to be related to normal hormonal changes that occur in pregnancy.  RISK FACTORS  You are at greater risk if you:  · Experienced nausea or vomiting before your pregnancy.  · Had morning sickness during a previous pregnancy.  · Are pregnant with more than one baby, such as twins.  TREATMENT   Do not use any medicines (prescription, over-the-counter, or herbal) for morning sickness without first talking to your health care provider. Your health care provider may prescribe or recommend:  · Vitamin B6 supplements.  · Anti-nausea medicines.  · The herbal medicine jacky.  HOME CARE INSTRUCTIONS   · Only take over-the-counter or prescription medicines as directed by your health care provider.  · Taking multivitamins before getting pregnant can prevent or decrease the severity of morning sickness in most women.  · Eat a piece of dry toast or unsalted crackers before getting out of bed in the morning.  · Eat five or six small meals a day.  · Eat dry and bland foods (rice, baked potato). Foods high in carbohydrates are often helpful.  · Do not drink liquids with your meals. Drink liquids between meals.  · Avoid greasy, fatty, and spicy foods.  · Get someone to cook for you if the smell of any food causes nausea and vomiting.  · If you feel nauseous after taking prenatal vitamins, take the vitamins at night or with a snack.   · Snack on protein  foods (nuts, yogurt, cheese) between meals if you are hungry.  · Eat unsweetened gelatins for desserts.  · Wearing an acupressure wristband (worn for sea sickness) may be helpful.  · Acupuncture may be helpful.  · Do not smoke.  · Get a humidifier to keep the air in your house free of odors.  · Get plenty of fresh air.  SEEK MEDICAL CARE IF:   · Your home remedies are not working, and you need medicine.  · You feel dizzy or lightheaded.  · You are losing weight.  SEEK IMMEDIATE MEDICAL CARE IF:   · You have persistent and uncontrolled nausea and vomiting.  · You pass out (faint).  MAKE SURE YOU:  · Understand these instructions.  · Will watch your condition.  · Will get help right away if you are not doing well or get worse.     This information is not intended to replace advice given to you by your health care provider. Make sure you discuss any questions you have with your health care provider.     Document Released: 02/08/2008 Document Revised: 12/23/2014 Document Reviewed: 06/04/2014  Elsevier Interactive Patient Education ©2016 Elsevier Inc.

## 2017-07-18 NOTE — ED NOTES
PIV d/c'd,  VSS.  Discharge instructions and prescriptions x2 given- verbalizes understanding.   Ambulatory to lobby with steady gait.

## 2017-07-18 NOTE — ED AVS SNAPSHOT
[employer] Access Code: [email or web address]  Expires: 8/5/2017  6:34 PM    Your email address is not on file at [email or web address].  Email Addresses are required for you to sign up for [employer], please contact [phone] to verify your personal information and to provide your email address prior to attempting to register for [employer].    Caroline Jorgensen  [address] [facility] [place] [place] [address]    [employer]  A secure, online tool to manage your health information     [facility]’s [employer]® is a secure, online tool that connects you to your personalized health information from the privacy of your home -- day or night - making it very easy for you to manage your healthcare. Once the activation process is completed, you can even access your medical information using the [employer] zackary, which is available for free in the Apple Zackary store or Google Play store.     To learn more about [employer], visit [email or web address]    There are two levels of access available (as shown below):   My Chart Features  [facility] Primary Care Doctor [facility]  Specialists [facility]  Urgent  Care Non-[facility] Primary Care Doctor   Email your healthcare team securely and privately 24/7 X X X    Manage appointments: schedule your next appointment; view details of past/upcoming appointments X      Request prescription refills. X      View recent personal medical records, including lab and immunizations X X X X   View health record, including health history, allergies, medications X X X X   Read reports about your outpatient visits, procedures, consult and ER notes X X X X   See your discharge summary, which is a recap of your hospital and/or ER visit that includes your diagnosis, lab results, and care plan X X  X     How to register for [employer]:  Once your e-mail address has been verified, follow the following steps to sign up for [employer].     1. Go to  [email or web address]  2. Click on the Sign Up Now box, which takes you to the New Member Sign Up page. You will need to  provide the following information:  a. Enter your [employer] Access Code exactly as it appears at the top of this page. (You will not need to use this code after you’ve completed the sign-up process. If you do not sign up before the expiration date, you must request a new code.)   b. Enter your date of birth.   c. Enter your home email address.   d. Click Submit, and follow the next screen’s instructions.  3. Create a [employer] ID. This will be your [employer] login ID and cannot be changed, so think of one that is secure and easy to remember.  4. Create a [employer] password. You can change your password at any time.  5. Enter your Password Reset Question and Answer. This can be used at a later time if you forget your password.   6. Enter your e-mail address. This allows you to receive e-mail notifications when new information is available in [employer].  7. Click Sign Up. You can now view your health information.    For assistance activating your [employer] account, call [phone]

## 2017-07-24 ENCOUNTER — HOSPITAL ENCOUNTER (EMERGENCY)
Facility: MEDICAL CENTER | Age: 22
End: 2017-07-24
Payer: COMMERCIAL

## 2017-07-24 VITALS
HEIGHT: 63 IN | OXYGEN SATURATION: 100 % | RESPIRATION RATE: 16 BRPM | SYSTOLIC BLOOD PRESSURE: 117 MMHG | DIASTOLIC BLOOD PRESSURE: 63 MMHG | BODY MASS INDEX: 28.24 KG/M2 | TEMPERATURE: 99.1 F | HEART RATE: 79 BPM | WEIGHT: 159.39 LBS

## 2017-07-24 LAB
ALBUMIN SERPL BCP-MCNC: 4.5 G/DL (ref 3.2–4.9)
ALBUMIN/GLOB SERPL: 1.3 G/DL
ALP SERPL-CCNC: 57 U/L (ref 30–99)
ALT SERPL-CCNC: 9 U/L (ref 2–50)
ANION GAP SERPL CALC-SCNC: 9 MMOL/L (ref 0–11.9)
AST SERPL-CCNC: 14 U/L (ref 12–45)
BASOPHILS # BLD AUTO: 0.4 % (ref 0–1.8)
BASOPHILS # BLD: 0.05 K/UL (ref 0–0.12)
BILIRUB SERPL-MCNC: 0.4 MG/DL (ref 0.1–1.5)
BUN SERPL-MCNC: 8 MG/DL (ref 8–22)
CALCIUM SERPL-MCNC: 9.9 MG/DL (ref 8.5–10.5)
CHLORIDE SERPL-SCNC: 102 MMOL/L (ref 96–112)
CO2 SERPL-SCNC: 22 MMOL/L (ref 20–33)
CREAT SERPL-MCNC: 0.56 MG/DL (ref 0.5–1.4)
EOSINOPHIL # BLD AUTO: 0.09 K/UL (ref 0–0.51)
EOSINOPHIL NFR BLD: 0.7 % (ref 0–6.9)
ERYTHROCYTE [DISTWIDTH] IN BLOOD BY AUTOMATED COUNT: 40.5 FL (ref 35.9–50)
GFR SERPL CREATININE-BSD FRML MDRD: >60 ML/MIN/1.73 M 2
GLOBULIN SER CALC-MCNC: 3.6 G/DL (ref 1.9–3.5)
GLUCOSE SERPL-MCNC: 82 MG/DL (ref 65–99)
HCT VFR BLD AUTO: 39.4 % (ref 37–47)
HGB BLD-MCNC: 12.9 G/DL (ref 12–16)
IMM GRANULOCYTES # BLD AUTO: 0.05 K/UL (ref 0–0.11)
IMM GRANULOCYTES NFR BLD AUTO: 0.4 % (ref 0–0.9)
LIPASE SERPL-CCNC: 20 U/L (ref 11–82)
LYMPHOCYTES # BLD AUTO: 2.39 K/UL (ref 1–4.8)
LYMPHOCYTES NFR BLD: 18.8 % (ref 22–41)
MCH RBC QN AUTO: 28.3 PG (ref 27–33)
MCHC RBC AUTO-ENTMCNC: 32.7 G/DL (ref 33.6–35)
MCV RBC AUTO: 86.4 FL (ref 81.4–97.8)
MONOCYTES # BLD AUTO: 0.67 K/UL (ref 0–0.85)
MONOCYTES NFR BLD AUTO: 5.3 % (ref 0–13.4)
NEUTROPHILS # BLD AUTO: 9.43 K/UL (ref 2–7.15)
NEUTROPHILS NFR BLD: 74.4 % (ref 44–72)
NRBC # BLD AUTO: 0 K/UL
NRBC BLD AUTO-RTO: 0 /100 WBC
PLATELET # BLD AUTO: 453 K/UL (ref 164–446)
PMV BLD AUTO: 9.5 FL (ref 9–12.9)
POTASSIUM SERPL-SCNC: 3.1 MMOL/L (ref 3.6–5.5)
PROT SERPL-MCNC: 8.1 G/DL (ref 6–8.2)
RBC # BLD AUTO: 4.56 M/UL (ref 4.2–5.4)
SODIUM SERPL-SCNC: 133 MMOL/L (ref 135–145)
WBC # BLD AUTO: 12.7 K/UL (ref 4.8–10.8)

## 2017-07-24 PROCEDURE — 83690 ASSAY OF LIPASE: CPT

## 2017-07-24 PROCEDURE — 80053 COMPREHEN METABOLIC PANEL: CPT

## 2017-07-24 PROCEDURE — 302449 STATCHG TRIAGE ONLY (STATISTIC)

## 2017-07-24 PROCEDURE — 85025 COMPLETE CBC W/AUTO DIFF WBC: CPT

## 2017-07-24 ASSESSMENT — PAIN SCALES - GENERAL: PAINLEVEL_OUTOF10: 10

## 2017-07-26 ENCOUNTER — HOSPITAL ENCOUNTER (EMERGENCY)
Facility: MEDICAL CENTER | Age: 22
End: 2017-07-26
Attending: EMERGENCY MEDICINE
Payer: COMMERCIAL

## 2017-07-26 ENCOUNTER — APPOINTMENT (OUTPATIENT)
Dept: RADIOLOGY | Facility: MEDICAL CENTER | Age: 22
End: 2017-07-26
Attending: EMERGENCY MEDICINE
Payer: COMMERCIAL

## 2017-07-26 VITALS
HEART RATE: 79 BPM | OXYGEN SATURATION: 100 % | RESPIRATION RATE: 18 BRPM | BODY MASS INDEX: 28.89 KG/M2 | HEIGHT: 62 IN | TEMPERATURE: 97.2 F | SYSTOLIC BLOOD PRESSURE: 107 MMHG | DIASTOLIC BLOOD PRESSURE: 55 MMHG | WEIGHT: 156.97 LBS

## 2017-07-26 DIAGNOSIS — R10.9 ABDOMINAL PAIN IN PREGNANCY, FIRST TRIMESTER: ICD-10-CM

## 2017-07-26 DIAGNOSIS — N76.0 BACTERIAL VAGINOSIS: ICD-10-CM

## 2017-07-26 DIAGNOSIS — B96.89 BACTERIAL VAGINOSIS: ICD-10-CM

## 2017-07-26 DIAGNOSIS — O26.891 ABDOMINAL PAIN IN PREGNANCY, FIRST TRIMESTER: ICD-10-CM

## 2017-07-26 LAB
APPEARANCE UR: CLEAR
B-HCG SERPL-ACNC: ABNORMAL MIU/ML (ref 0–5)
BACTERIA #/AREA URNS HPF: NEGATIVE /HPF
BACTERIA GENITAL QL WET PREP: NORMAL
BASOPHILS # BLD AUTO: 0.4 % (ref 0–1.8)
BASOPHILS # BLD: 0.06 K/UL (ref 0–0.12)
BILIRUB UR QL STRIP.AUTO: NEGATIVE
COLOR UR: YELLOW
CULTURE IF INDICATED INDCX: YES UA CULTURE
EOSINOPHIL # BLD AUTO: 0.06 K/UL (ref 0–0.51)
EOSINOPHIL NFR BLD: 0.4 % (ref 0–6.9)
EPI CELLS #/AREA URNS HPF: ABNORMAL /HPF
ERYTHROCYTE [DISTWIDTH] IN BLOOD BY AUTOMATED COUNT: 40.1 FL (ref 35.9–50)
GLUCOSE UR STRIP.AUTO-MCNC: NEGATIVE MG/DL
HCG SERPL QL: POSITIVE
HCT VFR BLD AUTO: 38.7 % (ref 37–47)
HGB BLD-MCNC: 12.7 G/DL (ref 12–16)
IMM GRANULOCYTES # BLD AUTO: 0.13 K/UL (ref 0–0.11)
IMM GRANULOCYTES NFR BLD AUTO: 1 % (ref 0–0.9)
KETONES UR STRIP.AUTO-MCNC: >=160 MG/DL
LEUKOCYTE ESTERASE UR QL STRIP.AUTO: NEGATIVE
LYMPHOCYTES # BLD AUTO: 1.79 K/UL (ref 1–4.8)
LYMPHOCYTES NFR BLD: 13.2 % (ref 22–41)
MCH RBC QN AUTO: 28.4 PG (ref 27–33)
MCHC RBC AUTO-ENTMCNC: 32.8 G/DL (ref 33.6–35)
MCV RBC AUTO: 86.6 FL (ref 81.4–97.8)
MICRO URNS: ABNORMAL
MONOCYTES # BLD AUTO: 0.65 K/UL (ref 0–0.85)
MONOCYTES NFR BLD AUTO: 4.8 % (ref 0–13.4)
NEUTROPHILS # BLD AUTO: 10.86 K/UL (ref 2–7.15)
NEUTROPHILS NFR BLD: 80.2 % (ref 44–72)
NITRITE UR QL STRIP.AUTO: NEGATIVE
NRBC # BLD AUTO: 0 K/UL
NRBC BLD AUTO-RTO: 0 /100 WBC
PH UR STRIP.AUTO: 6 [PH]
PLATELET # BLD AUTO: 441 K/UL (ref 164–446)
PMV BLD AUTO: 9.9 FL (ref 9–12.9)
PROT UR QL STRIP: 100 MG/DL
RBC # BLD AUTO: 4.47 M/UL (ref 4.2–5.4)
RBC # URNS HPF: ABNORMAL /HPF
RBC UR QL AUTO: NEGATIVE
SIGNIFICANT IND 70042: NORMAL
SITE SITE: NORMAL
SOURCE SOURCE: NORMAL
SP GR UR STRIP.AUTO: 1.03
TRANS CELLS #/AREA URNS HPF: ABNORMAL /HPF
UROBILINOGEN UR STRIP.AUTO-MCNC: 1 MG/DL
WBC # BLD AUTO: 13.6 K/UL (ref 4.8–10.8)
WBC #/AREA URNS HPF: ABNORMAL /HPF

## 2017-07-26 PROCEDURE — 84703 CHORIONIC GONADOTROPIN ASSAY: CPT

## 2017-07-26 PROCEDURE — 99285 EMERGENCY DEPT VISIT HI MDM: CPT

## 2017-07-26 PROCEDURE — 96374 THER/PROPH/DIAG INJ IV PUSH: CPT

## 2017-07-26 PROCEDURE — 85025 COMPLETE CBC W/AUTO DIFF WBC: CPT

## 2017-07-26 PROCEDURE — 96361 HYDRATE IV INFUSION ADD-ON: CPT

## 2017-07-26 PROCEDURE — 84702 CHORIONIC GONADOTROPIN TEST: CPT

## 2017-07-26 PROCEDURE — 87491 CHLMYD TRACH DNA AMP PROBE: CPT

## 2017-07-26 PROCEDURE — 87086 URINE CULTURE/COLONY COUNT: CPT

## 2017-07-26 PROCEDURE — 700111 HCHG RX REV CODE 636 W/ 250 OVERRIDE (IP): Performed by: EMERGENCY MEDICINE

## 2017-07-26 PROCEDURE — 76830 TRANSVAGINAL US NON-OB: CPT

## 2017-07-26 PROCEDURE — 700105 HCHG RX REV CODE 258: Performed by: EMERGENCY MEDICINE

## 2017-07-26 PROCEDURE — 81001 URINALYSIS AUTO W/SCOPE: CPT

## 2017-07-26 PROCEDURE — 87591 N.GONORRHOEAE DNA AMP PROB: CPT

## 2017-07-26 RX ORDER — SODIUM CHLORIDE 9 MG/ML
1000 INJECTION, SOLUTION INTRAVENOUS ONCE
Status: COMPLETED | OUTPATIENT
Start: 2017-07-26 | End: 2017-07-26

## 2017-07-26 RX ORDER — METOCLOPRAMIDE 10 MG/1
10 TABLET ORAL 4 TIMES DAILY
Qty: 20 TAB | Refills: 0 | Status: SHIPPED | OUTPATIENT
Start: 2017-07-26

## 2017-07-26 RX ORDER — METRONIDAZOLE 500 MG/1
500 TABLET ORAL 3 TIMES DAILY
Qty: 21 TAB | Refills: 0 | Status: SHIPPED | OUTPATIENT
Start: 2017-07-26 | End: 2017-08-02

## 2017-07-26 RX ORDER — METOCLOPRAMIDE HYDROCHLORIDE 5 MG/ML
10 INJECTION INTRAMUSCULAR; INTRAVENOUS ONCE
Status: COMPLETED | OUTPATIENT
Start: 2017-07-26 | End: 2017-07-26

## 2017-07-26 RX ADMIN — METOCLOPRAMIDE 10 MG: 5 INJECTION, SOLUTION INTRAMUSCULAR; INTRAVENOUS at 10:35

## 2017-07-26 RX ADMIN — SODIUM CHLORIDE 1000 ML: 9 INJECTION, SOLUTION INTRAVENOUS at 10:34

## 2017-07-26 ASSESSMENT — ENCOUNTER SYMPTOMS
ABDOMINAL PAIN: 1
DIARRHEA: 0
VOMITING: 1
CONSTIPATION: 1
FEVER: 1

## 2017-07-26 ASSESSMENT — PAIN SCALES - GENERAL: PAINLEVEL_OUTOF10: 9

## 2017-07-26 NOTE — ED AVS SNAPSHOT
Home Care Instructions                                                                                                                Caroline Jorgensen   MRN: [id number]    Department:  [facility], Emergency Dept   Date of Visit:  7/26/2017            [facility], Emergency Dept    [address]    [place] [address]    Phone:  [phone]      You were seen by     Destiney Leija D.O.      Your Diagnosis Was     Abdominal pain in pregnancy, first trimester     O26.891, R10.9       These are the medications you received during your hospitalization from 07/26/2017 0600 to 07/26/2017 1117     Date/Time Order Dose Route Action    07/26/2017 1034 NS infusion 1,000 mL 1,000 mL Intravenous New Bag    07/26/2017 1035 metoclopramide (REGLAN) injection 10 mg 10 mg Intravenous Given      Medication Information     Review all of your home medications and newly ordered medications with your primary doctor and/or pharmacist as soon as possible. Follow medication instructions as directed by your doctor and/or pharmacist.     Please keep your complete medication list with you and share with your physician. Update the information when medications are discontinued, doses are changed, or new medications (including over-the-counter products) are added; and carry medication information at all times in the event of emergency situations.               Medication List      START taking these medications        Instructions    Morning Afternoon Evening Bedtime    metoclopramide 10 MG Tabs   Commonly known as:  REGLAN        Take 1 Tab by mouth 4 times a day.   Dose:  10 mg                        metronidazole 500 MG Tabs   Commonly known as:  FLAGYL        Take 1 Tab by mouth 3 times a day for 7 days.   Dose:  500 mg                          ASK your doctor about these medications        Instructions    Morning Afternoon Evening Bedtime    Doxylamine-Pyridoxine 10-10 MG Tbec delayed-release tablet      Commonly known as:  DICLEGIS        Take 2 Tabs by mouth every bedtime.   Dose:  2 Tab                        ondansetron 4 MG Tbdp   Commonly known as:  ZOFRAN ODT        Take 1 Tab by mouth every 8 hours as needed for Nausea/Vomiting.   Dose:  4 mg                             Where to Get Your Medications      You can get these medications from any pharmacy     Bring a paper prescription for each of these medications    - metoclopramide 10 MG Tabs  - metronidazole 500 MG Tabs            Procedures and tests performed during your visit     BETA-HCG QUALITATIVE SERUM    BETA-HCG QUANTITATIVE SERUM    CBC WITH DIFFERENTIAL    CHLAMYDIA & GC BY PCR    URINALYSIS,CULTURE IF INDICATED    URINE CULTURE(NEW)    URINE MICROSCOPIC (W/UA)    US-GYN-PELVIS TRANSVAGINAL    WET PREP        Discharge Instructions       Bacterial Vaginosis  Bacterial vaginosis is a vaginal infection that occurs when the normal balance of bacteria in the vagina is disrupted. It results from an overgrowth of certain bacteria. This is the most common vaginal infection in women of childbearing age. Treatment is important to prevent complications, especially in pregnant women, as it can cause a premature delivery.  CAUSES   Bacterial vaginosis is caused by an increase in harmful bacteria that are normally present in smaller amounts in the vagina. Several different kinds of bacteria can cause bacterial vaginosis. However, the reason that the condition develops is not fully understood.  RISK FACTORS  Certain activities or behaviors can put you at an increased risk of developing bacterial vaginosis, including:  · Having a new sex partner or multiple sex partners.  · Douching.  · Using an intrauterine device (IUD) for contraception.  Women do not get bacterial vaginosis from toilet seats, bedding, swimming pools, or contact with objects around them.  SIGNS AND SYMPTOMS   Some women with bacterial vaginosis have no signs or symptoms. Common symptoms  include:  · Grey vaginal discharge.  · A fishlike odor with discharge, especially after sexual intercourse.  · Itching or burning of the vagina and vulva.  · Burning or pain with urination.  DIAGNOSIS   Your health care provider will take a medical history and examine the vagina for signs of bacterial vaginosis. A sample of vaginal fluid may be taken. Your health care provider will look at this sample under a microscope to check for bacteria and abnormal cells. A vaginal pH test may also be done.   TREATMENT   Bacterial vaginosis may be treated with antibiotic medicines. These may be given in the form of a pill or a vaginal cream. A second round of antibiotics may be prescribed if the condition comes back after treatment. Because bacterial vaginosis increases your risk for sexually transmitted diseases, getting treated can help reduce your risk for chlamydia, gonorrhea, HIV, and herpes.  HOME CARE INSTRUCTIONS   · Only take over-the-counter or prescription medicines as directed by your health care provider.  · If antibiotic medicine was prescribed, take it as directed. Make sure you finish it even if you start to feel better.  · Tell all sexual partners that you have a vaginal infection. They should see their health care provider and be treated if they have problems, such as a mild rash or itching.  · During treatment, it is important that you follow these instructions:  · Avoid sexual activity or use condoms correctly.  · Do not douche.  · Avoid alcohol as directed by your health care provider.  · Avoid breastfeeding as directed by your health care provider.  SEEK MEDICAL CARE IF:   · Your symptoms are not improving after 3 days of treatment.  · You have increased discharge or pain.  · You have a fever.  MAKE SURE YOU:   · Understand these instructions.  · Will watch your condition.  · Will get help right away if you are not doing well or get worse.  FOR MORE INFORMATION   Centers for Disease Control and  Prevention, Division of STD Prevention: www.cdc.gov/std  American Sexual Health Association (SADAF): www.ashastd.org      This information is not intended to replace advice given to you by your health care provider. Make sure you discuss any questions you have with your health care provider.     Document Released: 12/18/2006 Document Revised: 01/08/2016 Document Reviewed: 07/30/2014  [employer] Interactive Patient Education ©2016 [employer]    Abdominal Pain During Pregnancy  Abdominal pain is common in pregnancy. Most of the time, it does not cause harm. There are many causes of abdominal pain. Some causes are more serious than others. Some of the causes of abdominal pain in pregnancy are easily diagnosed. Occasionally, the diagnosis takes time to understand. Other times, the cause is not determined. Abdominal pain can be a sign that something is very wrong with the pregnancy, or the pain may have nothing to do with the pregnancy at all. For this reason, always tell your health care provider if you have any abdominal discomfort.  HOME CARE INSTRUCTIONS   Monitor your abdominal pain for any changes. The following actions may help to alleviate any discomfort you are experiencing:  · Do not have sexual intercourse or put anything in your vagina until your symptoms go away completely.  · Get plenty of rest until your pain improves.  · Drink clear fluids if you feel nauseous. Avoid solid food as long as you are uncomfortable or nauseous.  · Only take over-the-counter or prescription medicine as directed by your health care provider.  · Keep all follow-up appointments with your health care provider.  SEEK IMMEDIATE MEDICAL CARE IF:  · You are bleeding, leaking fluid, or passing tissue from the vagina.  · You have increasing pain or cramping.  · You have persistent vomiting.  · You have painful or bloody urination.  · You have a fever.  · You notice a decrease in your baby's movements.  · You have extreme weakness or feel  faint.  · You have shortness of breath, with or without abdominal pain.  · You develop a severe headache with abdominal pain.  · You have abnormal vaginal discharge with abdominal pain.  · You have persistent diarrhea.  · You have abdominal pain that continues even after rest, or gets worse.  MAKE SURE YOU:   · Understand these instructions.  · Will watch your condition.  · Will get help right away if you are not doing well or get worse.     This information is not intended to replace advice given to you by your health care provider. Make sure you discuss any questions you have with your health care provider.     Document Released: 12/18/2006 Document Revised: 10/08/2014 Document Reviewed: 07/17/2014  [employer] Interactive Patient Education ©2016 [employer]              Patient Information     Patient Information    Following emergency treatment: all patient requiring follow-up care must return either to a private physician or a clinic if your condition worsens before you are able to obtain further medical attention, please return to the emergency room.     Billing Information    At [facility], we work to make the billing process streamlined for our patients.  Our Representatives are here to answer any questions you may have regarding your hospital bill.  If you have insurance coverage and have supplied your insurance information to us, we will submit a claim to your insurer on your behalf.  Should you have any questions regarding your bill, we can be reached online or by phone as follows:  Online: You are able pay your bills online or live chat with our representatives about any billing questions you may have. We are here to help Monday - Friday from 8:00am to 7:30pm and 9:00am - 12:00pm on Saturdays.  Please visit [email or web address]  for more information.   Phone:  [phone] or [phone]    Please note that your emergency physician, surgeon, pathologist, radiologist,  anesthesiologist, and other specialists are not employed by [facility] and will therefore bill separately for their services.  Please contact them directly for any questions concerning their bills at the numbers below:     Emergency Physician Services:  [phone]  [facility]:  [phone]  [facility]:  [phone]  [facility]:  [phone]    1. Your final bill may vary from the amount quoted upon discharge if all procedures are not complete at that time, or if your doctor has additional procedures of which we are not aware. You will receive an additional bill if you return to the Emergency Department at [facility] for suture removal regardless of the facility of which the sutures were placed.     2. Please arrange for settlement of this account at the emergency registration.    3. All self-pay accounts are due in full at the time of treatment.  If you are unable to meet this obligation then payment is expected within 4-5 days.     4. If you have had radiology studies (CT, X-ray, Ultrasound, MRI), you have received a preliminary result during your emergency department visit. Please contact the radiology department [phone] to receive a copy of your final result. Please discuss the Final result with your primary physician or with the follow up physician provided.     Crisis Hotline:  [place] Crisis Hotline:  9-851-KPCCRLE or 1-654.915.4827  [place] Crisis Hotline:    1-538.402.9584 or 924-662-8037         ED Discharge Follow Up Questions    1. In order to provide you with very good care, we would like to follow up with a phone call in the next few days.  May we have your permission to contact you?     YES /  NO    2. What is the best phone number to call you? (       )_____-__________    3. What is the best time to call you?      Morning  /  Afternoon  /  Evening                   Patient Signature:   ____________________________________________________________    Date:  ____________________________________________________________

## 2017-07-26 NOTE — DISCHARGE INSTRUCTIONS
Bacterial Vaginosis  Bacterial vaginosis is a vaginal infection that occurs when the normal balance of bacteria in the vagina is disrupted. It results from an overgrowth of certain bacteria. This is the most common vaginal infection in women of childbearing age. Treatment is important to prevent complications, especially in pregnant women, as it can cause a premature delivery.  CAUSES   Bacterial vaginosis is caused by an increase in harmful bacteria that are normally present in smaller amounts in the vagina. Several different kinds of bacteria can cause bacterial vaginosis. However, the reason that the condition develops is not fully understood.  RISK FACTORS  Certain activities or behaviors can put you at an increased risk of developing bacterial vaginosis, including:  · Having a new sex partner or multiple sex partners.  · Douching.  · Using an intrauterine device (IUD) for contraception.  Women do not get bacterial vaginosis from toilet seats, bedding, swimming pools, or contact with objects around them.  SIGNS AND SYMPTOMS   Some women with bacterial vaginosis have no signs or symptoms. Common symptoms include:  · Grey vaginal discharge.  · A fishlike odor with discharge, especially after sexual intercourse.  · Itching or burning of the vagina and vulva.  · Burning or pain with urination.  DIAGNOSIS   Your health care provider will take a medical history and examine the vagina for signs of bacterial vaginosis. A sample of vaginal fluid may be taken. Your health care provider will look at this sample under a microscope to check for bacteria and abnormal cells. A vaginal pH test may also be done.   TREATMENT   Bacterial vaginosis may be treated with antibiotic medicines. These may be given in the form of a pill or a vaginal cream. A second round of antibiotics may be prescribed if the condition comes back after treatment. Because bacterial vaginosis increases your risk for sexually transmitted diseases, getting  treated can help reduce your risk for chlamydia, gonorrhea, HIV, and herpes.  HOME CARE INSTRUCTIONS   · Only take over-the-counter or prescription medicines as directed by your health care provider.  · If antibiotic medicine was prescribed, take it as directed. Make sure you finish it even if you start to feel better.  · Tell all sexual partners that you have a vaginal infection. They should see their health care provider and be treated if they have problems, such as a mild rash or itching.  · During treatment, it is important that you follow these instructions:  · Avoid sexual activity or use condoms correctly.  · Do not douche.  · Avoid alcohol as directed by your health care provider.  · Avoid breastfeeding as directed by your health care provider.  SEEK MEDICAL CARE IF:   · Your symptoms are not improving after 3 days of treatment.  · You have increased discharge or pain.  · You have a fever.  MAKE SURE YOU:   · Understand these instructions.  · Will watch your condition.  · Will get help right away if you are not doing well or get worse.  FOR MORE INFORMATION   Centers for Disease Control and Prevention, Division of STD Prevention: www.cdc.gov/std  American Sexual Health Association (SADAF): www.ashastd.org      This information is not intended to replace advice given to you by your health care provider. Make sure you discuss any questions you have with your health care provider.     Document Released: 12/18/2006 Document Revised: 01/08/2016 Document Reviewed: 07/30/2014  [employer] Interactive Patient Education ©2016 [employer]    Abdominal Pain During Pregnancy  Abdominal pain is common in pregnancy. Most of the time, it does not cause harm. There are many causes of abdominal pain. Some causes are more serious than others. Some of the causes of abdominal pain in pregnancy are easily diagnosed. Occasionally, the diagnosis takes time to understand. Other times, the cause is not determined. Abdominal pain can  be a sign that something is very wrong with the pregnancy, or the pain may have nothing to do with the pregnancy at all. For this reason, always tell your health care provider if you have any abdominal discomfort.  HOME CARE INSTRUCTIONS   Monitor your abdominal pain for any changes. The following actions may help to alleviate any discomfort you are experiencing:  · Do not have sexual intercourse or put anything in your vagina until your symptoms go away completely.  · Get plenty of rest until your pain improves.  · Drink clear fluids if you feel nauseous. Avoid solid food as long as you are uncomfortable or nauseous.  · Only take over-the-counter or prescription medicine as directed by your health care provider.  · Keep all follow-up appointments with your health care provider.  SEEK IMMEDIATE MEDICAL CARE IF:  · You are bleeding, leaking fluid, or passing tissue from the vagina.  · You have increasing pain or cramping.  · You have persistent vomiting.  · You have painful or bloody urination.  · You have a fever.  · You notice a decrease in your baby's movements.  · You have extreme weakness or feel faint.  · You have shortness of breath, with or without abdominal pain.  · You develop a severe headache with abdominal pain.  · You have abnormal vaginal discharge with abdominal pain.  · You have persistent diarrhea.  · You have abdominal pain that continues even after rest, or gets worse.  MAKE SURE YOU:   · Understand these instructions.  · Will watch your condition.  · Will get help right away if you are not doing well or get worse.     This information is not intended to replace advice given to you by your health care provider. Make sure you discuss any questions you have with your health care provider.     Document Released: 12/18/2006 Document Revised: 10/08/2014 Document Reviewed: 07/17/2014  [email or web address] Interactive Patient Education ©2016 [email or web address]

## 2017-07-26 NOTE — ED PROVIDER NOTES
ED Provider Note    Scribed for Destiney Leija D.O. by Denis Watt. 2017, 7:26 AM.    Means of arrival: Walk-in  History obtained from: Patient  History limited by: None.    CHIEF COMPLAINT  Chief Complaint   Patient presents with   • Pregnancy     8 weeks    • Abdominal Pain     onset 3 days ago       HPI  Caroline Jorgensen is a 22 y.o. female who presents to the Emergency Department complaining of abdominal pain onset about a week ago. Her pain increased in severity onset three days ago. The patient reports associated vomiting, subjective fever onset yesterday, decreased PO intake, constipation, and vaginal discharge. She denies any vaginal bleeding or diarrhea. She has had an ultrasound for her pregnancy three weeks ago which was done at approximately 5 weeks gestation per her report. Her last HCG quantitative revealed positive results as well. The patient is eight weeks into her pregnancy. She is status A1. Her first pregnancy was terminated at six weeks. Her last normal menstrual period was at the end of 2017. The patient is taking Zofran and Diclegis for nausea and vomiting of pregnancy, but reports that it is not working very well.    REVIEW OF SYSTEMS  Review of Systems   Constitutional: Positive for fever (subjective).   Gastrointestinal: Positive for vomiting, abdominal pain and constipation. Negative for diarrhea.        Positive for decreased PO intake   Genitourinary:        Positive for vaginal discharge.  Negative for vaginal bleeding.   All other systems reviewed and are negative.  C    PAST MEDICAL HISTORY   None    SURGICAL HISTORY  patient denies any surgical history    SOCIAL HISTORY  Social History   Substance Use Topics   • Smoking status: Never Smoker    • Smokeless tobacco: None   • Alcohol Use: No      History   Drug Use No       FAMILY HISTORY  History reviewed. No pertinent family history.    CURRENT MEDICATIONS  Home Medications     Reviewed by Elaina Roy R.N.  "(Registered Nurse) on 07/26/17 at 0646  Med List Status: Complete    Medication Last Dose Status    Doxylamine-Pyridoxine (DICLEGIS) 10-10 MG Tablet Delayed Response delayed-release tablet  Active    ondansetron (ZOFRAN ODT) 4 MG TABLET DISPERSIBLE  Active                ALLERGIES  No Known Allergies    PHYSICAL EXAM  VITAL SIGNS: /55 mmHg  Pulse 93  Temp(Src) 36.2 °C (97.2 °F)  Resp 18  Ht 1.575 m (5' 2\")  Wt 71.2 kg (156 lb 15.5 oz)  BMI 28.70 kg/m2  SpO2 97%  LMP 05/27/2017  Vitals reviewed.  Constitutional: Patient is oriented to person, place, and time. Appears well-developed and well-nourished. No distress.    Head: Normocephalic and atraumatic.   Ears: Normal external ears bilaterally.   Mouth/Throat: Oropharynx is clear and moist  Eyes: Conjunctivae are normal. Pupils are equal, round, and reactive to light.   Cardiovascular: Normal rate, regular rhythm and normal heart sounds. Normal peripheral pulses.  Pulmonary/Chest: Effort normal and breath sounds normal. No respiratory distress, no wheezes, rhonchi, or rales.   Abdominal: Soft. Bowel sounds are normal. There is no tenderness, rebound or guarding, or peritoneal signs. No CVA tenderness.  Pelvic: No CMT, no vaginal bleeding, small amount of thin whitish discharge. No masses or lesions.  Musculoskeletal: No edema and no tenderness.  mild bilateral lumbar paraspinal muscle soreness.  Neurological: No focal deficits.   Skin: Skin is warm and dry. No erythema. No pallor.   Psychiatric: Patient has a normal mood and affect.     LABS  Results for orders placed or performed during the hospital encounter of 07/26/17   BETA-HCG QUALITATIVE SERUM   Result Value Ref Range    Beta-Hcg Qualitative Serum Positive (A) Negative   CBC WITH DIFFERENTIAL   Result Value Ref Range    WBC 13.6 (H) 4.8 - 10.8 K/uL    RBC 4.47 4.20 - 5.40 M/uL    Hemoglobin 12.7 12.0 - 16.0 g/dL    Hematocrit 38.7 37.0 - 47.0 %    MCV 86.6 81.4 - 97.8 fL    MCH 28.4 27.0 - 33.0 pg "    MCHC 32.8 (L) 33.6 - 35.0 g/dL    RDW 40.1 35.9 - 50.0 fL    Platelet Count 441 164 - 446 K/uL    MPV 9.9 9.0 - 12.9 fL    Neutrophils-Polys 80.20 (H) 44.00 - 72.00 %    Lymphocytes 13.20 (L) 22.00 - 41.00 %    Monocytes 4.80 0.00 - 13.40 %    Eosinophils 0.40 0.00 - 6.90 %    Basophils 0.40 0.00 - 1.80 %    Immature Granulocytes 1.00 (H) 0.00 - 0.90 %    Nucleated RBC 0.00 /100 WBC    Neutrophils (Absolute) 10.86 (H) 2.00 - 7.15 K/uL    Lymphs (Absolute) 1.79 1.00 - 4.80 K/uL    Monos (Absolute) 0.65 0.00 - 0.85 K/uL    Eos (Absolute) 0.06 0.00 - 0.51 K/uL    Baso (Absolute) 0.06 0.00 - 0.12 K/uL    Immature Granulocytes (abs) 0.13 (H) 0.00 - 0.11 K/uL    NRBC (Absolute) 0.00 K/uL   URINALYSIS,CULTURE IF INDICATED   Result Value Ref Range    Color Yellow     Character Clear     Specific Gravity 1.035 <1.035    Ph 6.0 5.0-8.0    Glucose Negative Negative mg/dL    Ketones >=160 Negative mg/dL    Protein 100 (A) Negative mg/dL    Bilirubin Negative Negative    Urobilinogen, Urine 1.0 Negative    Nitrite Negative Negative    Leukocyte Esterase Negative Negative    Occult Blood Negative Negative    Micro Urine Req Microscopic     Culture Indicated Yes UA Culture   URINE MICROSCOPIC (W/UA)   Result Value Ref Range    WBC 10-20 (A) /hpf    RBC 0-2 /hpf    Bacteria Negative None /hpf    Epithelial Cells Many (A) /hpf    Trans Epithelial Cells Few /hpf   BETA-HCG QUANTITATIVE SERUM   Result Value Ref Range    Bhcg 331889.7 (H) 0.0 - 5.0 mIU/mL   WET PREP   Result Value Ref Range    Significant Indicator NEG     Source GEN     Site VAGINAL     Wet Prep For Parasites       No yeast.  No motile Trichomonas seen.  Moderate clue cells seen.  Moderate WBC's seen.     CHLAMYDIA & GC BY PCR   Result Value Ref Range    Source Vaginal      All labs reviewed by me.    RADIOLOGY  US-GYN-PELVIS TRANSVAGINAL   Final Result      Single IUP with heart rate 152 BPM. Gestational age 6 weeks 1 day and LEYDI 3/20/2018.        The  radiologist's interpretation of all radiological studies have been reviewed by me.    COURSE & MEDICAL DECISION MAKING  Pertinent Labs & Imaging studies reviewed. (See chart for details)    7:26 AM - Patient seen and examined at bedside. Patient presents with a week of abdominal pain, worse in the last 3 days. She also reports increased vaginal discharge but no bleeding. She's been having nausea and vomiting associated with pregnancy as well and reports minimal improvement with both likely just and Zofran. I informed the patient that she would need a pelvic exam. She verbalized agreement. Ordered US gyn pelvis transvaginal, U/A culture if indicated, Beta HCG qualitative serum, and CBC with differential to evaluate her symptoms. The differential diagnoses include but are not limited to: vaginitis vs UTI vs threatened miscarriage    7:38 AM Review of past labs reveal a normal HCG qualitative progression and that the patient is Rh positive.    7:39 AM I performed a pelvic exam at this time. Results can be viewed in the Physical Exam section.    10:21 AM I informed the patient of her ED test results.  She verbalized understanding. The patient requested NS fluids.    11:06 AM he is reevaluated. As we discussed it with her and as suspected based on patient's history and pelvic exam, she did test positive for clue cells. She'll be treated for bacterial vaginosis with Flagyl. She is also given Reglan. She started taking Zofran at home for nausea and vomiting associated with pregnancy but reports that it is not working. She has had no vomiting here in the ED. She was given IV fluids based on her report of decreased oral intake. Vital signs are normal. We previously discussed lab results including a beta quantitative hCG which is appropriately high. Her ultrasound shows a 6 week 1 day intrauterine pregnancy with normal fetal heart tones. This is less than what she reported based on dates. I've advised her to follow closely  with  working to ensure that the pregnancy is advancing as expected. She is well-appearing and nontoxic. To be discharged home in stable condition.    FINAL IMPRESSION  1. Abdominal pain in pregnancy, first trimester    2. Bacterial vaginosis          Denis PINEDA (Scribe), am scribing for, and in the presence of, Destiney Leija D.O..    Electronically signed by: Denis Watt (Scribe), 7/26/2017    IDestiney D.O. personally performed the services described in this documentation, as scribed by Denis Watt in my presence, and it is both accurate and complete.    The note accurately reflects work and decisions made by me.  Destiney Leija  7/26/2017  11:06 AM

## 2017-07-26 NOTE — ED NOTES
"Chief Complaint   Patient presents with   • Pregnancy     8 weeks    • Abdominal Pain     onset 3 days ago     Pt ambulatory to triage with family for above complaints. Pt is AOx4, denies CP/SOB. PT states abdominal pain started 3 days ago with associated nausea. Pt denies bleeding/discharge. /55 mmHg  Pulse 103  Temp(Src) 36.2 °C (97.2 °F)  Resp 18  Ht 1.575 m (5' 2\")  Wt 71.2 kg (156 lb 15.5 oz)  BMI 28.70 kg/m2  LMP 05/27/2017  Pt informed and educated on triage process and wait times. Pt verbalizes understanding to inform either triage tech or RN to alert staff for any changes in condition. Pt placed in lobby.    "

## 2017-07-26 NOTE — ED AVS SNAPSHOT
Truminim Access Code: [email or web address]  Expires: 8/5/2017  6:34 PM    Truminim  A secure, online tool to manage your health information     [employer]’s Truminim® is a secure, online tool that connects you to your personalized health information from the privacy of your home -- day or night - making it very easy for you to manage your healthcare. Once the activation process is completed, you can even access your medical information using the Truminim zackary, which is available for free in the Apple Zackary store or Google Play store.     Truminim provides the following levels of access (as shown below):   My Chart Features   [facility] Primary Care Doctor [facility]  Specialists [facility]  Urgent  Care Non-[facility]  Primary Care  Doctor   Email your healthcare team securely and privately 24/7 X X X X   Manage appointments: schedule your next appointment; view details of past/upcoming appointments X      Request prescription refills. X      View recent personal medical records, including lab and immunizations X X X X   View health record, including health history, allergies, medications X X X X   Read reports about your outpatient visits, procedures, consult and ER notes X X X X   See your discharge summary, which is a recap of your hospital and/or ER visit that includes your diagnosis, lab results, and care plan. X X       How to register for Truminim:  1. Go to  [email or web address].  2. Click on the Sign Up Now box, which takes you to the New Member Sign Up page. You will need to provide the following information:  a. Enter your Truminim Access Code exactly as it appears at the top of this page. (You will not need to use this code after you’ve completed the sign-up process. If you do not sign up before the expiration date, you must request a new code.)   b. Enter your date of birth.   c. Enter your home email address.   d. Click Submit, and follow the next screen’s instructions.  3. Create a Truminim ID. This will be your [employer]  login ID and cannot be changed, so think of one that is secure and easy to remember.  4. Create a [employer] password. You can change your password at any time.  5. Enter your Password Reset Question and Answer. This can be used at a later time if you forget your password.   6. Enter your e-mail address. This allows you to receive e-mail notifications when new information is available in [employer].  7. Click Sign Up. You can now view your health information.    For assistance activating your [employer] account, call [phone]

## 2017-07-26 NOTE — ED AVS SNAPSHOT
7/26/2017    Caroline Jorgensen  [address] [place] [id number]    Dear Caroline:    [facility] wants to ensure your discharge home is safe and you or your loved ones have had all of your questions answered regarding your care after you leave the hospital.    Below is a list of resources and contact information should you have any questions regarding your hospital stay, follow-up instructions, or active medical symptoms.    Questions or Concerns Regarding… Contact   Medical Questions Related to Your Discharge  (7 days a week, 8am-5pm) Contact a Nurse Care Coordinator   [phone]   Medical Questions Not Related to Your Discharge  (24 hours a day / 7 days a week)  Contact the Nurse Health Line   [phone]    Medications or Discharge Instructions Refer to your discharge packet   or contact your [facility] Primary Care Provider   [phone]   Follow-up Appointment(s) Schedule your appointment via [employer]   or contact Scheduling [phone]   Billing Review your statement via [employer]  or contact Billing [phone]   Medical Records Review your records via [employer]   or contact Medical Records [phone]     You may receive a telephone call within two days of discharge. This call is to make certain you understand your discharge instructions and have the opportunity to have any questions answered. You can also easily access your medical information, test results and upcoming appointments via the [employer] free online health management tool. You can learn more and sign up at [employer]. For assistance setting up your [employer] account, please call [phone].    Once again, we want to ensure your discharge home is safe and that you have a clear understanding of any next steps in your care. If you have any questions or concerns, please do not hesitate to contact us, we are here for you. Thank you for choosing [facility] for your healthcare needs.    Sincerely,    Your [facility] Healthcare Team

## 2017-07-27 LAB
C TRACH DNA SPEC QL NAA+PROBE: NEGATIVE
N GONORRHOEA DNA SPEC QL NAA+PROBE: NEGATIVE
SPECIMEN SOURCE: NORMAL

## 2017-07-28 LAB
BACTERIA UR CULT: NORMAL
SIGNIFICANT IND 70042: NORMAL
SITE SITE: NORMAL
SOURCE SOURCE: NORMAL

## 2019-07-01 NOTE — ED NOTES
"Chief Complaint   Patient presents with   • Abdominal Pain     low midline \"cramping\" for several days, worse today. Denies vaginal bleeding/discharge   • Pregnancy     states approx 8 weeks pregant and she had ultrasound confirming IUP. LMP approx , A1     Ambulatory to triage for above. Given urine sample supplies and instructions. Explained triage process, to waiting room. Asked to inform RN if questions or concerns arise.   " No